# Patient Record
Sex: MALE | Race: BLACK OR AFRICAN AMERICAN | Employment: OTHER | URBAN - METROPOLITAN AREA
[De-identification: names, ages, dates, MRNs, and addresses within clinical notes are randomized per-mention and may not be internally consistent; named-entity substitution may affect disease eponyms.]

---

## 2021-03-18 ENCOUNTER — OFFICE VISIT (OUTPATIENT)
Dept: INTERNAL MEDICINE CLINIC | Age: 31
End: 2021-03-18
Payer: MEDICAID

## 2021-03-18 VITALS
BODY MASS INDEX: 35.04 KG/M2 | SYSTOLIC BLOOD PRESSURE: 131 MMHG | RESPIRATION RATE: 16 BRPM | HEART RATE: 65 BPM | TEMPERATURE: 98.1 F | HEIGHT: 73 IN | OXYGEN SATURATION: 97 % | WEIGHT: 264.4 LBS | DIASTOLIC BLOOD PRESSURE: 86 MMHG

## 2021-03-18 DIAGNOSIS — Z11.59 NEED FOR HEPATITIS C SCREENING TEST: ICD-10-CM

## 2021-03-18 DIAGNOSIS — R10.13 EPIGASTRIC PAIN: ICD-10-CM

## 2021-03-18 DIAGNOSIS — Z00.00 VISIT FOR WELL MAN HEALTH CHECK: Primary | ICD-10-CM

## 2021-03-18 PROCEDURE — 99385 PREV VISIT NEW AGE 18-39: CPT | Performed by: FAMILY MEDICINE

## 2021-03-18 PROCEDURE — 99214 OFFICE O/P EST MOD 30 MIN: CPT | Performed by: FAMILY MEDICINE

## 2021-03-18 PROCEDURE — 93000 ELECTROCARDIOGRAM COMPLETE: CPT | Performed by: FAMILY MEDICINE

## 2021-03-18 RX ORDER — OMEPRAZOLE 40 MG/1
40 CAPSULE, DELAYED RELEASE ORAL DAILY
Qty: 30 CAP | Refills: 3 | Status: SHIPPED | OUTPATIENT
Start: 2021-03-18 | End: 2021-10-28 | Stop reason: ALTCHOICE

## 2021-03-18 NOTE — PROGRESS NOTES
Chief Complaint   Patient presents with    Complete Physical     Patient is here for a wellness visit. he is a 33y.o. year old male who presents for CPE. Complete Physical Exam Questions:    1. Do you follow a low fat diet?  no  2. Are you up to date on your Tdap (<10 years)? Unknown  3. Have you ever had a Pneumovax vaccine (>65)? Not applicable   ABT32 Not applicable   DTET48 Not applicable  4. Have you had Zoster vaccine (>60)? Not applicable  5. Have you had the HPV - Gardasil (13- 26)? Not applicable  6. Do you follow an exercise program?  no  7. Do you smoke?  yes If > 65 and smoker, have you had a abdominal aortic aneurysm ultrasound screen? No  8. Do you consider yourself overweight?  yes  9. Is there a family history of CAD< age 48? Unknown  10. Is there a family history of Cancer? Yes  11. Do you know your Cancer risks? Yes  12. Have you had a colonoscopy? Not applicable  13. Have you been tested for HIV or other STI's? No HIV today(18-66 y/o)? Yes   14. Have you had an EKG in the last five years(>50)? Not applicable  15. Have you had a PSA test done this year (50-69)? Not applicable    Other complaints: none    Reviewed and agree with Nurse Note and duplicated in this note. Reviewed PmHx, RxHx, FmHx, SocHx, AllgHx and updated and dated in the chart. History reviewed. No pertinent family history. History reviewed. No pertinent past medical history.    Social History     Socioeconomic History    Marital status: SINGLE     Spouse name: Not on file    Number of children: Not on file    Years of education: Not on file    Highest education level: Not on file   Tobacco Use    Smoking status: Never Smoker    Smokeless tobacco: Never Used   Substance and Sexual Activity    Alcohol use: Yes     Frequency: 2-4 times a month    Drug use: Yes     Types: Marijuana    Sexual activity: Yes        Review of Systems - negative except as listed above      Objective:     Vitals: 03/18/21 1121   BP: 131/86   Pulse: 65   Resp: 16   Temp: 98.1 °F (36.7 °C)   SpO2: 97%   Weight: 264 lb 6.4 oz (119.9 kg)   Height: 6' 1\" (1.854 m)       Physical Examination: General appearance - alert, well appearing, and in no distress  Eyes - pupils equal and reactive, extraocular eye movements intact  Ears - bilateral TM's and external ear canals normal  Nose - normal and patent, no erythema, discharge or polyps  Mouth - mucous membranes moist, pharynx normal without lesions  Neck - supple, no significant adenopathy  Chest - clear to auscultation, no wheezes, rales or rhonchi, symmetric air entry  Heart - normal rate, regular rhythm, normal S1, S2, no murmurs, rubs, clicks or gallops  Abdomen - soft, nontender, nondistended, no masses or organomegaly  Neurological - alert, oriented, normal speech, no focal findings or movement disorder noted  Musculoskeletal - no joint tenderness, deformity or swelling  Extremities - peripheral pulses normal, no pedal edema, no clubbing or cyanosis  Skin - normal coloration and turgor, no rashes, no suspicious skin lesions noted       Assessment/ Plan:   Diagnoses and all orders for this visit:    1. Visit for well man health check  -     CBC W/O DIFF; Future  -     LIPID PANEL; Future  -     METABOLIC PANEL, COMPREHENSIVE; Future    2. Epigastric pain    3. Need for hepatitis C screening test  -     HEPATITIS C AB; Future           Labs to be drawn: CBC, CMP, Lipid            I have discussed the diagnosis with the patient and the intended plan as seen in the above orders. The patient has received an after-visit summary and questions were answered concerning future plans. Chief Complaint   Patient presents with    Complete Physical     Patient is here for a wellness visit. he is a 33y.o. year old male who presents for evaluation of Maricruz Santana reports 2 months onset of chest discomfort described as  in the mid chest.  The discomfort is intermittent (<1 minute). he is not currently having chest discomfort. he denies radiation of discomfort to the  arm. Associated symptoms include: none. Cardiac risk factors include:  Age > 27 and male. he has not had a past history of cardiovascular disease and has not had recent work ups of chest pain. Reviewed and agree with Nurse Note and duplicated in this note. Reviewed PmHx, RxHx, FmHx, SocHx, AllgHx and updated and dated in the chart. History reviewed. No pertinent family history. History reviewed. No pertinent past medical history.    Social History     Socioeconomic History    Marital status: SINGLE     Spouse name: Not on file    Number of children: Not on file    Years of education: Not on file    Highest education level: Not on file   Tobacco Use    Smoking status: Never Smoker    Smokeless tobacco: Never Used   Substance and Sexual Activity    Alcohol use: Yes     Frequency: 2-4 times a month    Drug use: Yes     Types: Marijuana    Sexual activity: Yes        Review of Systems - negative except as listed above      Objective:     Vitals:    03/18/21 1121   BP: 131/86   Pulse: 65   Resp: 16   Temp: 98.1 °F (36.7 °C)   SpO2: 97%   Weight: 264 lb 6.4 oz (119.9 kg)   Height: 6' 1\" (1.854 m)       Physical Examination: General appearance - alert, well appearing, and in no distress  Eyes - pupils equal and reactive, extraocular eye movements intact  Ears - bilateral TM's and external ear canals normal  Nose - normal and patent, no erythema, discharge or polyps  Mouth - mucous membranes moist, pharynx normal without lesions  Neck - supple, no significant adenopathy  Chest - clear to auscultation, no wheezes, rales or rhonchi, symmetric air entry  Heart - normal rate, regular rhythm, normal S1, S2, no murmurs, rubs, clicks or gallops  Abdomen - soft, nontender, nondistended, no masses or organomegaly  Back exam - full range of motion, no tenderness, palpable spasm or pain on motion  Neurological - alert, oriented, normal speech, no focal findings or movement disorder noted  Musculoskeletal - no joint tenderness, deformity or swelling  Extremities - peripheral pulses normal, no pedal edema, no clubbing or cyanosis  Skin - normal coloration and turgor, no rashes, no suspicious skin lesions noted     Assessment/ Plan:   Diagnoses and all orders for this visit:    1. Visit for well man health check  -     CBC W/O DIFF; Future  -     LIPID PANEL; Future  -     METABOLIC PANEL, COMPREHENSIVE; Future    2. Epigastric pain  -     AMB POC EKG ROUTINE W/ 12 LEADS, INTER & REP    3. Need for hepatitis C screening test  -     HEPATITIS C AB; Future    Other orders  -     omeprazole (PRILOSEC) 40 mg capsule; Take 1 Cap by mouth daily. Patient with epigastric pain most likely related to GERD. Will do a trial of omeprazole. Patient understands if any worsening pain or radiation will proceed to ER. Follow-up in 4 weeks for GERD        I have discussed the diagnosis with the patient and the intended plan as seen in the above orders. The patient has received an after-visit summary and questions were answered concerning future plans. Medication Side Effects and Warnings were discussed with patient,  Patient Labs were reviewed and or requested, and  Patient Past Records were reviewed and or requested  yes       Pt agrees to call or return to clinic and/or go to closest ER with any worsening of symptoms. This may include, but not limited to increased fever (>100.4) with NSAIDS or Tylenol, increased edema, confusion, rash, worsening of presenting symptoms. Please note that this dictation was completed with [employer], the computer voice recognition software. Quite often unanticipated grammatical, syntax, homophones, and other interpretive errors are inadvertently transcribed by the computer software. Please disregard these errors. Please excuse any errors that have escaped final proofreading. Thank you.

## 2021-03-22 LAB
ALBUMIN SERPL-MCNC: 4 G/DL (ref 3.5–5)
ALBUMIN/GLOB SERPL: 1.1 {RATIO} (ref 1.1–2.2)
ALP SERPL-CCNC: 88 U/L (ref 45–117)
ALT SERPL-CCNC: 18 U/L (ref 12–78)
ANION GAP SERPL CALC-SCNC: 5 MMOL/L (ref 5–15)
AST SERPL-CCNC: 18 U/L (ref 15–37)
BILIRUB SERPL-MCNC: 0.6 MG/DL (ref 0.2–1)
BUN SERPL-MCNC: 11 MG/DL (ref 6–20)
BUN/CREAT SERPL: 9 (ref 12–20)
CALCIUM SERPL-MCNC: 9 MG/DL (ref 8.5–10.1)
CHLORIDE SERPL-SCNC: 106 MMOL/L (ref 97–108)
CHOLEST SERPL-MCNC: 158 MG/DL
CO2 SERPL-SCNC: 27 MMOL/L (ref 21–32)
CREAT SERPL-MCNC: 1.2 MG/DL (ref 0.7–1.3)
ERYTHROCYTE [DISTWIDTH] IN BLOOD BY AUTOMATED COUNT: 13.5 % (ref 11.5–14.5)
GLOBULIN SER CALC-MCNC: 3.6 G/DL (ref 2–4)
GLUCOSE SERPL-MCNC: 80 MG/DL (ref 65–100)
HCT VFR BLD AUTO: 42.5 % (ref 36.6–50.3)
HCV AB SERPL QL IA: NONREACTIVE
HCV COMMENT,HCGAC: NORMAL
HDLC SERPL-MCNC: 46 MG/DL
HDLC SERPL: 3.4 {RATIO} (ref 0–5)
HGB BLD-MCNC: 13.6 G/DL (ref 12.1–17)
LDLC SERPL CALC-MCNC: 101.4 MG/DL (ref 0–100)
LIPID PROFILE,FLP: ABNORMAL
MCH RBC QN AUTO: 31 PG (ref 26–34)
MCHC RBC AUTO-ENTMCNC: 32 G/DL (ref 30–36.5)
MCV RBC AUTO: 96.8 FL (ref 80–99)
NRBC # BLD: 0 K/UL (ref 0–0.01)
NRBC BLD-RTO: 0 PER 100 WBC
PLATELET # BLD AUTO: 335 K/UL (ref 150–400)
PMV BLD AUTO: 9.6 FL (ref 8.9–12.9)
POTASSIUM SERPL-SCNC: 4.2 MMOL/L (ref 3.5–5.1)
PROT SERPL-MCNC: 7.6 G/DL (ref 6.4–8.2)
RBC # BLD AUTO: 4.39 M/UL (ref 4.1–5.7)
SODIUM SERPL-SCNC: 138 MMOL/L (ref 136–145)
TRIGL SERPL-MCNC: 53 MG/DL (ref ?–150)
VLDLC SERPL CALC-MCNC: 10.6 MG/DL
WBC # BLD AUTO: 11.7 K/UL (ref 4.1–11.1)

## 2021-09-07 ENCOUNTER — OFFICE VISIT (OUTPATIENT)
Dept: INTERNAL MEDICINE CLINIC | Age: 31
End: 2021-09-07
Payer: MEDICAID

## 2021-09-07 VITALS
RESPIRATION RATE: 16 BRPM | WEIGHT: 267 LBS | BODY MASS INDEX: 35.39 KG/M2 | HEIGHT: 73 IN | TEMPERATURE: 99.2 F | SYSTOLIC BLOOD PRESSURE: 137 MMHG | OXYGEN SATURATION: 98 % | DIASTOLIC BLOOD PRESSURE: 81 MMHG | HEART RATE: 67 BPM

## 2021-09-07 DIAGNOSIS — L24.9 IRRITANT CONTACT DERMATITIS, UNSPECIFIED TRIGGER: ICD-10-CM

## 2021-09-07 DIAGNOSIS — R19.7 DIARRHEA, UNSPECIFIED TYPE: Primary | ICD-10-CM

## 2021-09-07 DIAGNOSIS — R07.89 ATYPICAL CHEST PAIN: ICD-10-CM

## 2021-09-07 PROCEDURE — 99214 OFFICE O/P EST MOD 30 MIN: CPT | Performed by: FAMILY MEDICINE

## 2021-09-07 RX ORDER — TRIAMCINOLONE ACETONIDE 1 MG/G
OINTMENT TOPICAL 2 TIMES DAILY
Qty: 30 G | Refills: 0 | Status: SHIPPED | OUTPATIENT
Start: 2021-09-07 | End: 2021-12-14 | Stop reason: ALTCHOICE

## 2021-09-07 NOTE — PROGRESS NOTES
Chief Complaint   Patient presents with    Heartburn    Rash    Generalized Body Aches     he is a 33y.o. year old male who presents for evaluation of body rash, chest pain and possible heartburn. Patient states that intermittently he'll get left chest wall tightness. He cannot relate this with food or exercise. Patient states that it has moved from his epigastric region or mid chest to the left side of his chest wall. States that he took an antacid back in March and this did not help. Patient does seem anxious and worried about his heart in general.  Patient also states that he has a rash along his beltline and also along his neckline. Patient states that since high school anytime he wears a metal necklace he will break out. Reviewed and agree with Nurse Note and duplicated in this note. Reviewed PmHx, RxHx, FmHx, SocHx, AllgHx and updated and dated in the chart. No family history on file. No past medical history on file. Social History     Socioeconomic History    Marital status: SINGLE     Spouse name: Not on file    Number of children: Not on file    Years of education: Not on file    Highest education level: Not on file   Tobacco Use    Smoking status: Never Smoker    Smokeless tobacco: Never Used   Substance and Sexual Activity    Alcohol use: Yes    Drug use: Yes     Types: Marijuana    Sexual activity: Yes     Social Determinants of Health     Financial Resource Strain:     Difficulty of Paying Living Expenses:    Food Insecurity:     Worried About Running Out of Food in the Last Year:     [address] in the Last Year:    Transportation Needs:     Lack of Transportation (Medical):      Lack of Transportation (Non-Medical):    Physical Activity:     Days of Exercise per Week:     Minutes of Exercise per Session:    Stress:     Feeling of Stress :    Social Connections:     Frequency of Communication with Friends and Family:     Frequency of Social Gatherings with Friends and Family:     Attends Protestant Services:     Active Member of Clubs or Organizations:     Attends Club or Organization Meetings:     Marital Status:         Review of Systems - negative except as listed above      Objective:     Vitals:    09/07/21 1514   BP: 137/81   Pulse: 67   Resp: 16   Temp: 99.2 °F (37.3 °C)   SpO2: 98%   Weight: 267 lb (121.1 kg)   Height: 6' 1\" (1.854 m)       Physical Examination: General appearance - alert, well appearing, and in no distress  Eyes - pupils equal and reactive, extraocular eye movements intact  Ears - bilateral TM's and external ear canals normal  Nose - normal and patent, no erythema, discharge or polyps  Mouth - mucous membranes moist, pharynx normal without lesions  Neck - supple, no significant adenopathy  Chest - clear to auscultation, no wheezes, rales or rhonchi, symmetric air entry  Heart - normal rate, regular rhythm, normal S1, S2, no murmurs, rubs, clicks or gallops  Abdomen - soft, nontender, nondistended, no masses or organomegaly  Musculoskeletal - no joint tenderness, deformity or swelling  Extremities - peripheral pulses normal, no pedal edema, no clubbing or cyanosis  Skin - normal coloration and turgor, no rashes, no suspicious skin lesions noted     Assessment/ Plan:   Diagnoses and all orders for this visit:    1. Diarrhea, unspecified type  -     REFERRAL TO GASTROENTEROLOGY  -     REFERRAL TO DERMATOLOGY  -     FOOD ALLERGY PROFILE; Future    2. Atypical chest pain  -     REFERRAL TO CARDIOLOGY    3. Irritant contact dermatitis, unspecified trigger    Other orders  -     triamcinolone acetonide (KENALOG) 0.1 % ointment; Apply  to affected area two (2) times a day. use thin layer           I have discussed the diagnosis with the patient and the intended plan as seen in the above orders. The patient has received an after-visit summary and questions were answered concerning future plans.      Medication Side Effects and Warnings were discussed with patient,  Patient Labs were reviewed and or requested, and  Patient Past Records were reviewed and or requested  yes       Pt agrees to call or return to clinic and/or go to closest ER with any worsening of symptoms. This may include, but not limited to increased fever (>100.4) with NSAIDS or Tylenol, increased edema, confusion, rash, worsening of presenting symptoms. Please note that this dictation was completed with [employer], the computer voice recognition software. Quite often unanticipated grammatical, syntax, homophones, and other interpretive errors are inadvertently transcribed by the computer software. Please disregard these errors. Please excuse any errors that have escaped final proofreading. Thank you.

## 2021-09-08 LAB
COMMENT, HOLDF: NORMAL
SAMPLES BEING HELD,HOLD: NORMAL

## 2021-09-10 LAB
CLAM IGE QN: <0.1 KU/L
CLASS DESCRIPTION, 600268: ABNORMAL
CODFISH IGE QN: <0.1 KU/L
CORN IGE QN: <0.1 KU/L
COW MILK IGE QN: <0.1 KU/L
EGG WHITE IGE QN: <0.1 KU/L
PEANUT IGE QN: 0.6 KU/L
SCALLOP IGE QN: 0.14 KU/L
SESAME SEED IGE QN: <0.1 KU/L
SHRIMP IGE QN: 0.11 KU/L
SOYBEAN IGE QN: <0.1 KU/L
WALNUT IGE QN: <0.1 KU/L
WHEAT IGE QN: <0.1 KU/L

## 2021-09-16 ENCOUNTER — OFFICE VISIT (OUTPATIENT)
Dept: INTERNAL MEDICINE CLINIC | Age: 31
End: 2021-09-16
Payer: MEDICAID

## 2021-09-16 VITALS
RESPIRATION RATE: 16 BRPM | OXYGEN SATURATION: 98 % | SYSTOLIC BLOOD PRESSURE: 124 MMHG | BODY MASS INDEX: 36.31 KG/M2 | WEIGHT: 274 LBS | HEART RATE: 87 BPM | TEMPERATURE: 98.9 F | DIASTOLIC BLOOD PRESSURE: 80 MMHG | HEIGHT: 73 IN

## 2021-09-16 DIAGNOSIS — G57.02 PIRIFORMIS SYNDROME OF LEFT SIDE: Primary | ICD-10-CM

## 2021-09-16 PROCEDURE — 99214 OFFICE O/P EST MOD 30 MIN: CPT | Performed by: FAMILY MEDICINE

## 2021-09-16 PROCEDURE — 72100 X-RAY EXAM L-S SPINE 2/3 VWS: CPT | Performed by: FAMILY MEDICINE

## 2021-09-16 RX ORDER — NAPROXEN 500 MG/1
500 TABLET ORAL 2 TIMES DAILY WITH MEALS
Qty: 30 TABLET | Refills: 0 | Status: SHIPPED | OUTPATIENT
Start: 2021-09-16 | End: 2021-12-14 | Stop reason: ALTCHOICE

## 2021-09-16 NOTE — PROGRESS NOTES
Chief Complaint   Patient presents with    Generalized Body Aches     Patient is here for a follow up     he is a 33y.o. year old male who presents for follow-up of body pain   Pain Assessment Encounter      Gómez Tika.  9/16/2021  Onset of Symptoms: Patient states he has had pain for months   ________________________________________________________________________  Description:Patient states pain is more so in the back of left leg     Frequency: 5 times a day  Pain Scale:(1-10): 6  Trauma Hx: none   Hx of similar symptoms: No:   Radiation: NO, leg  Duration:  continuous      Progression: has worsened  What makes it better?: heat, ice and OTC meds  What makes it worse?:exercise, strecthing and walking  Medications tried: acetaminophen, ibuprofen, aspirin    Reviewed and agree with Nurse Note and duplicated in this note. Reviewed PmHx, RxHx, FmHx, SocHx, AllgHx and updated and dated in the chart. No family history on file. No past medical history on file. Social History     Socioeconomic History    Marital status: SINGLE     Spouse name: Not on file    Number of children: Not on file    Years of education: Not on file    Highest education level: Not on file   Tobacco Use    Smoking status: Never Smoker    Smokeless tobacco: Never Used   Substance and Sexual Activity    Alcohol use: Yes    Drug use: Yes     Types: Marijuana    Sexual activity: Yes     Social Determinants of Health     Financial Resource Strain:     Difficulty of Paying Living Expenses:    Food Insecurity:     Worried About Running Out of Food in the Last Year:     [address] in the Last Year:    Transportation Needs:     Lack of Transportation (Medical):      Lack of Transportation (Non-Medical):    Physical Activity:     Days of Exercise per Week:     Minutes of Exercise per Session:    Stress:     Feeling of Stress :    Social Connections:     Frequency of Communication with Friends and Family:     Frequency of Social Gatherings with Friends and Family:     Attends [employer]ian[employer] Services:     Active Member of Clubs or Organizations:     Attends Club or Organization Meetings:     Marital Status:         Review of Systems - negative except as listed above      Objective:     Vitals:    09/16/21 1401   BP: 124/80   Pulse: 87   Resp: 16   Temp: 98.9 °F (37.2 °C)   SpO2: 98%   Weight: 274 lb (124.3 kg)   Height: 6' 1\" (1.854 m)       Physical Examination: General appearance - alert, well appearing, and in no distress  Back exam - full range of motion, no tenderness, palpable spasm or pain on motion, negative straight-leg raise bilaterally , normal reflexes and strength bilateral lower extremities, sensory exam intact bilateral lower extremities  Neurological - alert, oriented, normal speech, no focal findings or movement disorder noted  Musculoskeletal -   MSK - Hip left:    Deformity: None    ROM:     Flexion: Normal    Extension: Normal     Internal/external rotation: Normal      Gait: Normal       Palpation:    L1-L5: Negative tenderness    Sacrum: Negative tenderness    Coccyx: Negativetenderness    Left Paraspinal: Negativetenderness    Right Paraspinal: Negativetenderness    Greater trochanter: Negativetenderness    Ischial Tuberosity: Negativetenderness    Piriformis: Positivetenderness       Strength (0-5/5)    Hip Flexion:  Left: 5/5  Right: 5/5    Hip Extension: Left: 5/5  Right: 5/5    Hip Abduction: Left: 5/5  Right: 5/5    Hip Adduction: Left: 5/5  Right: 5/5    Knee Extension: Left: 5/5  Right: 5/5    Knee Flexion:  Left: 5/5  Right: 5/5    One leg squat:       Sensation: Intact, no deficits      DTR:    Patella:2       Achilles: 2   Special test:    Straight leg:Negative     Cecils:Negative     Piriformis:Negative     FADIR is Negative      Extremities - peripheral pulses normal, no pedal edema, no clubbing or cyanosis  Skin - normal coloration and turgor, no rashes, no suspicious skin lesions noted Assessment/ Plan:   Diagnoses and all orders for this visit:    1. Piriformis syndrome of left side  -     XR SPINE LUMB 2 OR 3 V; Future    Other orders  -     naproxen (NAPROSYN) 500 mg tablet; Take 1 Tablet by mouth two (2) times daily (with meals). Pathophysiology, recovery and rehabilitation process discussed and questions answered   Counseling for 30 Minutes of the total visit duration   Pictures and figures used as necessary   Provided reassurance   Monitor response to injection   Discussed steroid side effects of fat atrophy, hypopigmentation, steroid flare or infection   Monitor response to Physical Therapy   Recommend activity modification   Recommend  lower impact activities-walking, Eliptical, Nordic Track, cycling or swimming              1) Remember to stay active and/or exercise regularly (I suggest 30-45 minutes daily)   2) For reliable dietary information, go to www. EATRIGHT.org. You may wish to consider seeing the nutritionist at [facility] [phone], also consider the [address] I have discussed the diagnosis with the patient and the intended plan as seen in the above orders. The patient has received an after-visit summary and questions were answered concerning future plans. Medication Side Effects and Warnings were discussed with patient,  Patient Labs were reviewed and or requested, and  Patient Past Records were reviewed and or requested  yes      Pt agrees to call or return to clinic and/or go to closest ER with any worsening of symptoms. This may include, but not limited to increased fever (>100.4) with NSAIDS or Tylenol, increased edema, confusion, rash, worsening of presenting symptoms. Please note that this dictation was completed with [employer], the computer voice recognition software. Quite often unanticipated grammatical, syntax, homophones, and other interpretive errors are inadvertently transcribed by the computer software.   Please disregard these errors. Please excuse any errors that have escaped final proofreading. Thank you.

## 2021-09-22 ENCOUNTER — HOSPITAL ENCOUNTER (OUTPATIENT)
Dept: PHYSICAL THERAPY | Age: 31
Discharge: HOME | End: 2021-09-22
Payer: MEDICAID

## 2021-09-22 DIAGNOSIS — G57.02 PIRIFORMIS SYNDROME OF LEFT SIDE: ICD-10-CM

## 2021-09-22 PROCEDURE — 97161 PT EVAL LOW COMPLEX 20 MIN: CPT | Performed by: PHYSICAL THERAPIST

## 2021-09-22 NOTE — PROGRESS NOTES
Physical Therapy at [facility],   a part of [address], [address] [place] [address]  Phone: [phone]  Fax: [phone]    Plan of Care/Statement of Necessity for Physical Therapy Services  2-15    Patient name: Mamie Faith. : 1990  Provider#: [id number]  Referral source: Gala Ingram MD      Medical/Treatment Diagnosis: Piriformis syndrome of left side [G57.02]     Prior Hospitalization: see medical history     Comorbidities: previous back injury  Prior Level of Function: complete 20 minutes of exercise at least 1-2 times a week  Medications: Verified on Patient Summary List    Start of Care: 2021      Onset Date: 2020       The Plan of Care and following information is based on the information from the initial evaluation. Assessment/ key information: 33 y.o male with left piriformis syndrome secondary to core/pelvis muscle performance deficit and lumbar muscle performance deficit with associated sciatic nerve tension intolerance. Evaluation Complexity History MEDIUM  Complexity : 1-2 comorbidities / personal factors will impact the outcome/ POC ; Examination HIGH Complexity : 4+ Standardized tests and measures addressing body structure, function, activity limitation and / or participation in recreation  ;Presentation LOW Complexity : Stable, uncomplicated  ;   Overall Complexity Rating: LOW     Problem List: pain affecting function, decrease ROM, decrease strength, decrease ADL/ functional abilitiies, decrease activity tolerance and decrease flexibility/ joint mobility   Treatment Plan may include any combination of the following: Therapeutic exercise, Therapeutic activities, Neuromuscular re-education, Physical agent/modality, Gait/balance training, Manual therapy, Patient education and Self Care training  Patient / Family readiness to learn indicated by: asking questions and trying to perform skills  Persons(s) to be included in education: patient (P)  Barriers to Learning/Limitations: None  Patient Goal (s): get rid of pain while sitting  Patient Self Reported Health Status: good  Rehabilitation Potential: good    Short Term Goals: To be accomplished in 4-6 treatments:  1) Pt will be Independent with HEP  2) Pt will be able to Sit greater than 15 minutes without pain  3) Pt will be able to Stand greater than 15 minutes without increase of pain  4) Pt will be able to Ambulate greater than 1 mile without increase of pain    Long Term Goals: To be accomplished in 8-12 treatments:  1)  Pt will be able to Sit greater than 45 minutes without pain  2)Pt will be able to retrieve item form ground without pain  3) Pt will be able to carry >/= 20 lbs without pain      Frequency / Duration: Patient to be seen 1-2 times per week for 8-12 treatments. Patient/ Caregiver education and instruction: activity modification    [x]  Plan of care has been reviewed with MEIR Pantoja, PT, DPT 9/22/2021     ________________________________________________________________________    I certify that the above Therapy Services are being furnished while the patient is under my care. I agree with the treatment plan and certify that this therapy is necessary.     [de-identified] Signature:____________________  Date:____________Time: _________      Pavan Soto MD

## 2021-09-22 NOTE — PROGRESS NOTES
PT INITIAL EVALUATION NOTE 2-15    Patient Name: Rika Capps. Date:2021  : 1990  [x]  Patient  Verified  Payor: Shira Becerra / Plan: PFI Acquisition / Product Type: Managed Care Medicaid /    In QWAR:[id number]  Out time:1145p  Total Treatment Time (min): 30  Visit #: 1     Treatment Area: Piriformis syndrome of left side [G57.02]    SUBJECTIVE  Pain Level (0-10 scale): 6/10  Any medication changes, allergies to medications, adverse drug reactions, diagnosis change, or new procedure performed?: [] No    [x] Yes (see summary sheet for update)  Subjective:     Onset of left buttock pain approx 1 year ago. Currently he feels increased left hip/buttock. Increase of left buttock, posterior thigh and occasionally the left achilles sitting > 5 minutes. 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  injured his back that got better with therapy. OBJECTIVE    Posture:  midline alignment in standing, slouched sitting posure  Other Observations:  --  Gait and Functional Mobility:  Increased pain with sit-stand and bed rolling  Palpation: tenderness left buttock region and bilateral lumbar paraspinal muscles        Lumbar AROM:          R  L    Flexion    60  --    Extension   20  --    Side Bending   20 P! L  15 P! L    Rotation   30  30        LOWER QUARTER   MUSCLE STRENGTH  KEY       R  L  0 - No Contraction  L1, L2 Psoas  5  5  1 - Trace   L3 Quads  5  5  2 - Poor   L4 Tib Ant  5  5  3 - Fair    L5 EHL  5  5  4 - Good   S1 Peroneals  5  5  5 - Normal   S2 Hams  5  5    Flexibility: left hip stiffness  Mobility Assessment: Normal Lumbar A-P and P-A mobility      MMT:               HIP Ext: L 4/5  r 5/5              HIP Abd: : L 4/5   R 5/5  Neurological: Reflexes / Sensations: normal  Special Tests:     Forward Bend: positive      H.S. SLR: negative            Other Objective/Functional Measures: --    Pain Level (0-10 scale) post treatment: 6/10      ASSESSMENT:      [x]  See Plan of Care      Austin Galan PT, DPT 9/22/2021

## 2021-09-29 ENCOUNTER — HOSPITAL ENCOUNTER (OUTPATIENT)
Dept: PHYSICAL THERAPY | Age: 31
Discharge: HOME | End: 2021-09-29
Payer: MEDICAID

## 2021-09-29 PROCEDURE — 97110 THERAPEUTIC EXERCISES: CPT

## 2021-09-29 PROCEDURE — 97140 MANUAL THERAPY 1/> REGIONS: CPT

## 2021-09-29 NOTE — PROGRESS NOTES
PT DAILY TREATMENT NOTE - [facility] 2-15    Patient Name: Timbo Basurto. Date:2021  : 1990  [x]  Patient  Verified  Payor: Ric Fee / Plan: [employer] / Product Type: Managed Care Medicaid /    In time: 1:46P  Out time: 2:55P  Total Treatment Time (min): 69  Total Timed Codes (min): 59  1:1 Treatment Time ( [address] only): --   Visit #:  2    Treatment Area: Left hip pain [M25.552]    SUBJECTIVE  Pain Level (0-10 scale): 5/10  Any medication changes, allergies to medications, adverse drug reactions, diagnosis change, or new procedure performed?: [x] No    [] Yes (see summary sheet for update)  Subjective functional status/changes:   [] No changes reported  Pt reported feeling okay today. OBJECTIVE    Modality rationale: decrease edema, decrease inflammation and decrease pain to improve the patients ability to decrease L hip pain.    Min Type Additional Details       [] Estim: []Att   []Unatt    []TENS instruct                  []IFC  []Premod   []NMES                     []Other:  []w/US   []w/ice   []w/heat  Position:  Location:       []  Traction: [] Cervical       []Lumbar                       [] Prone          []Supine                       []Intermittent   []Continuous Lbs:  [] before manual  [] after manual  []w/heat    []  Ultrasound: []Continuous   [] Pulsed                       at: []1MHz   []3MHz Location:  W/cm2:    [] Paraffin         Location:   []w/heat   10 [x]  Ice     []  Heat  []  Ice massage Position: R s/l  Location: L hip    []  Laser  []  Other: Position:  Location:      []  Vasopneumatic Device Pressure:       [] lo [] med [] hi   Temperature:      [x] Skin assessment post-treatment:  [x]intact []redness- no adverse reaction    []redness  adverse reaction:     44 min Therapeutic Exercise:  [x] See flow sheet :   Rationale: increase ROM, increase strength, improve coordination, improve balance and increase proprioception to improve the patients ability to increase hip stability    15 min Manual Therapy: STM/MFR L piriformis and gluts. MWM L piriformis with hip IR and ER    Rationale: decrease pain, increase ROM, increase tissue extensibility and decrease trigger points to improve the patients ability to increase mobility          With   [] TE   [] TA   [] Neuro   [] SC   [] other: Patient Education: [x] Review HEP    [] Progressed/Changed HEP based on:   [] positioning   [] body mechanics   [] transfers   [] heat/ice application    [] other:      Other Objective/Functional Measures: FOTO 95     Pain Level (0-10 scale) post treatment: 4/10    ASSESSMENT/Changes in Function:   Pt reported after riding the bike and sitting on the table he felt an increase in radicular symptoms in his L leg. symptoms resolved after stretching and walking around clinic. May try treadmill next session. Patient will continue to benefit from skilled PT services to modify and progress therapeutic interventions, address functional mobility deficits, address ROM deficits, address strength deficits, analyze and address soft tissue restrictions, analyze and cue movement patterns, analyze and modify body mechanics/ergonomics and assess and modify postural abnormalities to attain remaining goals. []  See Plan of Care  []  See progress note/recertification  []  See Discharge Summary         Progress towards goals / Updated goals:  Short Term Goals: To be accomplished in 4-6 treatments:  1) Pt will be Independent with HEP  2) Pt will be able to Sit greater than 15 minutes without pain  3) Pt will be able to Stand greater than 15 minutes without increase of pain  4) Pt will be able to Ambulate greater than 1 mile without increase of pain     Long Term Goals:  To be accomplished in 8-12 treatments:  1)  Pt will be able to Sit greater than 45 minutes without pain  2)Pt will be able to retrieve item form ground without pain  3) Pt will be able to carry >/= 20 lbs without pain                        Frequency / Duration: Patient to be seen 1-2 times per week for 8-12 treatments.     PLAN  [x]  Upgrade activities as tolerated     [x]  Continue plan of care  []  Update interventions per flow sheet       []  Discharge due to:_  []  Other:_      Leita Leyden, PTA, [facility] 9/29/2021

## 2021-09-30 ENCOUNTER — HOSPITAL ENCOUNTER (OUTPATIENT)
Dept: PHYSICAL THERAPY | Age: 31
Discharge: HOME | End: 2021-09-30
Payer: MEDICAID

## 2021-09-30 PROCEDURE — 97140 MANUAL THERAPY 1/> REGIONS: CPT

## 2021-09-30 PROCEDURE — 97110 THERAPEUTIC EXERCISES: CPT

## 2021-09-30 NOTE — PROGRESS NOTES
PT DAILY TREATMENT NOTE - [facility] 2-15    Patient Name: Hilda Burnett. Date:2021  : 1990  [x]  Patient  Verified  Payor: Riazkacie  / Plan: Lenny Lopez / Product Type: Managed Care Medicaid /    In time: 1:17P  Out time: 2:10P  Total Treatment Time (min): 53  Total Timed Codes (min): 43  1:1 Treatment Time ( [address] only): --   Visit #:  3    Treatment Area: Left hip pain [M25.552]    SUBJECTIVE  Pain Level (0-10 scale): 0/10  Any medication changes, allergies to medications, adverse drug reactions, diagnosis change, or new procedure performed?: [x] No    [] Yes (see summary sheet for update)  Subjective functional status/changes:   [] No changes reported  Pt reported feeling alright after session yesterday. OBJECTIVE    Modality rationale: decrease edema, decrease inflammation and decrease pain to improve the patients ability to decrease L hip pain.    Min Type Additional Details       [] Estim: []Att   []Unatt    []TENS instruct                  []IFC  []Premod   []NMES                     []Other:  []w/US   []w/ice   []w/heat  Position:  Location:       []  Traction: [] Cervical       []Lumbar                       [] Prone          []Supine                       []Intermittent   []Continuous Lbs:  [] before manual  [] after manual  []w/heat    []  Ultrasound: []Continuous   [] Pulsed                       at: []1MHz   []3MHz Location:  W/cm2:    [] Paraffin         Location:   []w/heat   10 [x]  Ice     []  Heat  []  Ice massage Position: R s/l  Location: L hip    []  Laser  []  Other: Position:  Location:      []  Vasopneumatic Device Pressure:       [] lo [] med [] hi   Temperature:      [x] Skin assessment post-treatment:  [x]intact []redness- no adverse reaction    []redness  adverse reaction:     33 min Therapeutic Exercise:  [x] See flow sheet :   Rationale: increase ROM, increase strength, improve coordination, improve balance and increase proprioception to improve the patients ability to increase hip stability    10 min Manual Therapy: STM/MFR L piriformis and gluts. MWM L piriformis with hip IR and ER    Rationale: decrease pain, increase ROM, increase tissue extensibility and decrease trigger points to improve the patients ability to increase mobility          With   [] TE   [] TA   [] Neuro   [] SC   [] other: Patient Education: [x] Review HEP    [] Progressed/Changed HEP based on:   [] positioning   [] body mechanics   [] transfers   [] heat/ice application    [] other:      Other Objective/Functional Measures: --     Pain Level (0-10 scale) post treatment: 0/10    ASSESSMENT/Changes in Function:   Pt tolerated session well. Increases sets of stability and strengthening exercise. No pain noted. Patient will continue to benefit from skilled PT services to modify and progress therapeutic interventions, address functional mobility deficits, address ROM deficits, address strength deficits, analyze and address soft tissue restrictions, analyze and cue movement patterns, analyze and modify body mechanics/ergonomics and assess and modify postural abnormalities to attain remaining goals. []  See Plan of Care  []  See progress note/recertification  []  See Discharge Summary         Progress towards goals / Updated goals:  Short Term Goals: To be accomplished in 4-6 treatments:  1) Pt will be Independent with HEP  2) Pt will be able to Sit greater than 15 minutes without pain  3) Pt will be able to Stand greater than 15 minutes without increase of pain  4) Pt will be able to Ambulate greater than 1 mile without increase of pain     Long Term Goals: To be accomplished in 8-12 treatments:  1)  Pt will be able to Sit greater than 45 minutes without pain  2)Pt will be able to retrieve item form ground without pain  3) Pt will be able to carry >/= 20 lbs without pain                        Frequency / Duration: Patient to be seen 1-2 times per week for 8-12 treatments.     PLAN  [x] Upgrade activities as tolerated     [x]  Continue plan of care  []  Update interventions per flow sheet       []  Discharge due to:_  []  Other:_      Jak Zhu PTA, OPT[facility] 9/30/2021

## 2021-10-07 ENCOUNTER — APPOINTMENT (OUTPATIENT)
Dept: PHYSICAL THERAPY | Age: 31
End: 2021-10-07
Payer: MEDICAID

## 2021-10-08 ENCOUNTER — HOSPITAL ENCOUNTER (OUTPATIENT)
Dept: PREADMISSION TESTING | Age: 31
Discharge: HOME | End: 2021-10-08
Payer: MEDICAID

## 2021-10-08 ENCOUNTER — TRANSCRIBE ORDER (OUTPATIENT)
Dept: REGISTRATION | Age: 31
End: 2021-10-08

## 2021-10-08 DIAGNOSIS — Z01.812 PRE-PROCEDURE LAB EXAM: ICD-10-CM

## 2021-10-08 DIAGNOSIS — Z01.812 PRE-PROCEDURE LAB EXAM: Primary | ICD-10-CM

## 2021-10-08 PROCEDURE — U0005 INFEC AGEN DETEC AMPLI PROBE: HCPCS

## 2021-10-10 LAB
SARS-COV-2, XPLCVT: NOT DETECTED
SOURCE, COVRS: NORMAL

## 2021-10-12 ENCOUNTER — APPOINTMENT (OUTPATIENT)
Dept: PHYSICAL THERAPY | Age: 31
End: 2021-10-12
Payer: MEDICAID

## 2021-10-12 ENCOUNTER — ANESTHESIA (OUTPATIENT)
Dept: ENDOSCOPY | Age: 31
End: 2021-10-12
Payer: MEDICAID

## 2021-10-12 ENCOUNTER — HOSPITAL ENCOUNTER (OUTPATIENT)
Age: 31
Setting detail: OUTPATIENT SURGERY
Discharge: HOME | End: 2021-10-12
Attending: SPECIALIST | Admitting: SPECIALIST
Payer: MEDICAID

## 2021-10-12 ENCOUNTER — ANESTHESIA EVENT (OUTPATIENT)
Dept: ENDOSCOPY | Age: 31
End: 2021-10-12
Payer: MEDICAID

## 2021-10-12 VITALS
HEART RATE: 57 BPM | TEMPERATURE: 98.6 F | DIASTOLIC BLOOD PRESSURE: 87 MMHG | OXYGEN SATURATION: 99 % | RESPIRATION RATE: 21 BRPM | SYSTOLIC BLOOD PRESSURE: 149 MMHG

## 2021-10-12 LAB
H PYLORI FROM TISSUE: NEGATIVE
KIT LOT NO., HCLOLOT: NORMAL
NEGATIVE CONTROL: NEGATIVE
POSITIVE CONTROL: POSITIVE

## 2021-10-12 PROCEDURE — 74011000250 HC RX REV CODE- 250: Performed by: NURSE ANESTHETIST, CERTIFIED REGISTERED

## 2021-10-12 PROCEDURE — 74011250636 HC RX REV CODE- 250/636: Performed by: NURSE ANESTHETIST, CERTIFIED REGISTERED

## 2021-10-12 PROCEDURE — 76040000019: Performed by: SPECIALIST

## 2021-10-12 PROCEDURE — 87077 CULTURE AEROBIC IDENTIFY: CPT | Performed by: SPECIALIST

## 2021-10-12 PROCEDURE — 77030021593 HC FCPS BIOP ENDOSC BSC -A: Performed by: SPECIALIST

## 2021-10-12 PROCEDURE — 76060000031 HC ANESTHESIA FIRST 0.5 HR: Performed by: SPECIALIST

## 2021-10-12 PROCEDURE — 2709999900 HC NON-CHARGEABLE SUPPLY: Performed by: SPECIALIST

## 2021-10-12 PROCEDURE — 88305 TISSUE EXAM BY PATHOLOGIST: CPT

## 2021-10-12 RX ORDER — LIDOCAINE HYDROCHLORIDE 20 MG/ML
INJECTION, SOLUTION EPIDURAL; INFILTRATION; INTRACAUDAL; PERINEURAL AS NEEDED
Status: DISCONTINUED | OUTPATIENT
Start: 2021-10-12 | End: 2021-10-12 | Stop reason: HOSPADM

## 2021-10-12 RX ORDER — SODIUM CHLORIDE 9 MG/ML
INJECTION, SOLUTION INTRAVENOUS
Status: DISCONTINUED | OUTPATIENT
Start: 2021-10-12 | End: 2021-10-12 | Stop reason: HOSPADM

## 2021-10-12 RX ORDER — PROPOFOL 10 MG/ML
INJECTION, EMULSION INTRAVENOUS AS NEEDED
Status: DISCONTINUED | OUTPATIENT
Start: 2021-10-12 | End: 2021-10-12 | Stop reason: HOSPADM

## 2021-10-12 RX ADMIN — PROPOFOL 50 MG: 10 INJECTION, EMULSION INTRAVENOUS at 11:39

## 2021-10-12 RX ADMIN — LIDOCAINE HYDROCHLORIDE 100 MG: 20 INJECTION, SOLUTION EPIDURAL; INFILTRATION; INTRACAUDAL; PERINEURAL at 11:33

## 2021-10-12 RX ADMIN — PROPOFOL 150 MG: 10 INJECTION, EMULSION INTRAVENOUS at 11:33

## 2021-10-12 RX ADMIN — SODIUM CHLORIDE: 900 INJECTION, SOLUTION INTRAVENOUS at 11:16

## 2021-10-12 RX ADMIN — PROPOFOL 50 MG: 10 INJECTION, EMULSION INTRAVENOUS at 11:35

## 2021-10-12 NOTE — H&P
Pre-endoscopy H and P     The patient was seen and examined in the endoscopy suite. The airway was assessed and docuemented. The problem list and medications were reviewed. There is no problem list on file for this patient. Social History     Socioeconomic History    Marital status: SINGLE     Spouse name: Not on file    Number of children: Not on file    Years of education: Not on file    Highest education level: Not on file   Occupational History    Not on file   Tobacco Use    Smoking status: Never Smoker    Smokeless tobacco: Never Used   Substance and Sexual Activity    Alcohol use: Yes    Drug use: Yes     Types: Marijuana    Sexual activity: Yes   Other Topics Concern    Not on file   Social History Narrative    Not on file     Social Determinants of Health     Financial Resource Strain:     Difficulty of Paying Living Expenses:    Food Insecurity:     Worried About Running Out of Food in the Last Year:     [address] in the Last Year:    Transportation Needs:     Lack of Transportation (Medical):  Lack of Transportation (Non-Medical):    Physical Activity:     Days of Exercise per Week:     Minutes of Exercise per Session:    Stress:     Feeling of Stress :    Social Connections:     Frequency of Communication with Friends and Family:     Frequency of Social Gatherings with Friends and Family:     Attends [employer] Services:     Active Member of Clubs or Organizations:     Attends Club or Organization Meetings:     Marital Status:    Intimate Partner Violence:     Fear of Current or Ex-Partner:     Emotionally Abused:     Physically Abused:     Sexually Abused:      Past Medical History:   Diagnosis Date    GERD (gastroesophageal reflux disease)          Prior to Admission Medications   Prescriptions Last Dose Informant Patient Reported?  Taking?   naproxen (NAPROSYN) 500 mg tablet Not Taking at Unknown time  No No   Sig: Take 1 Tablet by mouth two (2) times daily (with meals). Patient not taking: Reported on 10/12/2021   omeprazole (PRILOSEC) 40 mg capsule Not Taking at Unknown time  No No   Sig: Take 1 Cap by mouth daily. Patient not taking: Reported on 10/12/2021   triamcinolone acetonide (KENALOG) 0.1 % ointment   No No   Sig: Apply  to affected area two (2) times a day. use thin layer      Facility-Administered Medications: None       Chief complaint, history of present illness, and review of systems and Past medical History are positive for: epigastric pain, diarrhea, chest pain, RUQ pain    The heart, lungs and mental status were satisfactory for the administration of sedation and for the procedure. I discussed with the patient the objectives, risks, consequences and alternatives to the procedure. The patient was counseled at length about the risks of binu Covid-19 in the regi-operative and post-operative states including the recovery window of their procedure. The patient was made aware that binu Covid-19 after a surgical procedure may worsen their prognosis for recovering from the virus and lend to a higher morbidity and or mortality risk. The patient was given the options of postponing their procedure. All of the risks, benefits, and alternatives were discussed. The patient does  wish to proceed with the procedure.     Plan: Endoscopic procedure with sedation     Lex Vernon MD   10/12/2021  11:28 AM

## 2021-10-12 NOTE — DISCHARGE INSTRUCTIONS
Corita Rubinstein  [id number]  1990    EGD DISCHARGE INSTRUCTIONS  Discomfort:  Sore throat- throat lozenges or warm salt water gargle  redness at IV site- apply warm compress to area; if redness or soreness persist- contact your physician  Gaseous discomfort- walking, belching will help relieve any discomfort    DIET  You may resume your regular diet - however -  remember your colon is empty and a heavy meal will produce gas. Avoid these foods:  vegetables, fried / greasy foods, carbonated drinks  You may not drink alcoholic beverages for at least 12 hours    MEDICATIONS   Regarding Aspirin or Nonsteroidal medications specifically, please see below. ACTIVITY  You may resume your normal daily activities. Spend the remainder of the day resting -  avoid any strenuous activity. You may not operate a vehicle for 12 hours  You may not engage in an occupation involving machinery or appliances for rest of today. Avoid making any critical decisions for at least 24 hour    CALL M.D. ANY SIGN OF   Increasing pain, nausea, vomiting  Abdominal distension (swelling)  New increased bleeding (oral or rectal)  Fever (chills)  Pain in chest area  Bloody discharge from nose or mouth  Shortness of breath    You may not  take any Advil, Aspirin, Ibuprofen, Motrin, Aleve, or Goodys for 10 days, ONLY  Tylenol as needed for pain.     Post procedure diagnosis: duodenitis  submucosal lesion of esophagus      Follow-up Instructions:   Call Dr. Lukasz Morales  Results of procedure / biopsy in 10 days  Telephone #  [phone]        DISCHARGE SUMMARY from Nurse    The following personal items collected during your admission are returned to you:   Dental Appliance:    Vision: Visual Aid: None  Hearing Aid:    Jewelry:    Clothing:    Other Valuables:    Valuables sent to safe:

## 2021-10-12 NOTE — PERIOP NOTES

## 2021-10-12 NOTE — ANESTHESIA POSTPROCEDURE EVALUATION
Procedure(s):  ESOPHAGOGASTRODUODENOSCOPY (EGD)   :-  ESOPHAGOGASTRODUODENAL (EGD) BIOPSY. MAC    Anesthesia Post Evaluation        Patient participation: complete - patient participated  Level of consciousness: awake  Pain management: adequate  Airway patency: patent  Anesthetic complications: no  Cardiovascular status: hemodynamically stable  Respiratory status: acceptable  Hydration status: acceptable  Comments: The patient is ready for PACU discharge. Steffanie Cobb DO                   Post anesthesia nausea and vomiting:  controlled      INITIAL Post-op Vital signs:   Vitals Value Taken Time   /87 10/12/21 1211   Temp 37 °C (98.6 °F) 10/12/21 1156   Pulse 63 10/12/21 1216   Resp 20 10/12/21 1216   SpO2 98 % 10/12/21 1211   Vitals shown include unvalidated device data.

## 2021-10-12 NOTE — ANESTHESIA PREPROCEDURE EVALUATION
Relevant Problems   No relevant active problems       Anesthetic History   No history of anesthetic complications            Review of Systems / Medical History  Patient summary reviewed, nursing notes reviewed and pertinent labs reviewed    Pulmonary  Within defined limits                 Neuro/Psych   Within defined limits           Cardiovascular  Within defined limits                     GI/Hepatic/Renal     GERD           Endo/Other  Within defined limits           Other Findings              Physical Exam    Airway  Mallampati: II  TM Distance: > 6 cm  Neck ROM: normal range of motion   Mouth opening: Normal     Cardiovascular  Regular rate and rhythm,  S1 and S2 normal,  no murmur, click, rub, or gallop             Dental  No notable dental hx       Pulmonary  Breath sounds clear to auscultation               Abdominal  GI exam deferred       Other Findings            Anesthetic Plan    ASA: 2  Anesthesia type: MAC            Anesthetic plan and risks discussed with: Patient

## 2021-10-12 NOTE — ROUTINE PROCESS
Christa Moreno.  1990  [id number]    Situation:  Verbal report received from: CHERISE Tejada RN  Procedure: Procedure(s):  ESOPHAGOGASTRODUODENOSCOPY (EGD)   :-    Background:    Preoperative diagnosis: Diarrhea, unspecified type [R19.7]  Epigastric pain [R10.13]  RLQ abdominal pain [R10.31]  Chest pain, unspecified type [R07.9]  RUQ pain [R10.11]  Postoperative diagnosis: duodenitis  submucosal lesion of esophagus    :  Dr. Angeli Koroma  Assistant(s): Endoscopy Technician-1: [facility]  Endoscopy RN-1: Hector Bryan RN    Specimens:   ID Type Source Tests Collected by Time Destination   1 : antrum biopsy Preservative Stomach, Antrum  Andrzej Garcia MD 10/12/2021 1144 Pathology   2 : distal esophagus Preservative Esophagus, Distal  Andrzej Garcia MD 10/12/2021 1144 Pathology     H. Pylori  yes    Assessment:  Intra-procedure medications Anesthesia gave intra-procedure sedation and medications, see anesthesia flow sheet yes    Intravenous fluids: NS@ KVO     Vital signs stable     Abdominal assessment: round and soft     Recommendation:  Discharge patient per MD order.     Family or Friend   Permission to share finding with family or friend no

## 2021-10-12 NOTE — PROCEDURES
[address]  [address]                 NAME:  Saywer Hawley. :   1990   MRN:   [id number]     Date/Time:  10/12/2021 11:46 AM    Esophagogastroduodenoscopy (EGD) Procedure Note    :  Melita Storey MD    Staff: Endoscopy Technician-1: Thuan Ching  Endoscopy RN-1: Juan Lyon RN     Referring Provider:  Christa Delgadillo MD    Anethesia/Sedation:  MAC anesthesia Propofol    Preoperative diagnosis: Diarrhea, unspecified type [R19.7]  Epigastric pain [R10.13]  RLQ abdominal pain [R10.31]  Chest pain, unspecified type [R07.9]  RUQ pain [R10.11]    Postoperative diagnosis: duodenitis  submucosal lesion of esophagus    Procedure Details     After infom consent was obtained for the procedure, with all risks and benefits of procedure explained the patient was taken to the endoscopy suite and placed in the left lateral decubitus position. Following sequential administration of sedation as per above, the [id number] gastroscope was inserted into the mouth and advanced under direct vision to second portion of the duodenum. A careful inspection was made as the gastroscope was withdrawn, including a retroflexed view of the proximal stomach; findings and interventions are described below. Findings:  Esophagus:6 mm submucosal lesion in distal esophagus biopsied  Stomach:normal mucosa, SAHIL and biopsies done  Duodenum/jejunum:erosive duodenitis in bulb      Therapies:  none    Specimens: gastric, esophageal bx           EBL: None    Complications:   None; patient tolerated the procedure well. Impression:    See Postoperative diagnosis above    Recommendations:  -Acid suppression with a proton pump inhibitor. , -Await pathology. , -No NSAIDS    Discharge disposition:  Home in the company of  when able to ambulate    Melita Storey MD

## 2021-10-13 ENCOUNTER — APPOINTMENT (OUTPATIENT)
Dept: PHYSICAL THERAPY | Age: 31
End: 2021-10-13
Payer: MEDICAID

## 2021-10-22 ENCOUNTER — APPOINTMENT (OUTPATIENT)
Dept: PHYSICAL THERAPY | Age: 31
End: 2021-10-22
Payer: MEDICAID

## 2021-10-26 ENCOUNTER — HOSPITAL ENCOUNTER (OUTPATIENT)
Dept: PHYSICAL THERAPY | Age: 31
Discharge: HOME | End: 2021-10-26
Payer: MEDICAID

## 2021-10-26 PROCEDURE — 97110 THERAPEUTIC EXERCISES: CPT | Performed by: PHYSICAL THERAPIST

## 2021-10-26 NOTE — PROGRESS NOTES
PT DAILY TREATMENT NOTE - [facility] 2-15    Patient Name: Cj Calvin. Date:10/26/2021  : 1990  [x]  Patient  Verified  Payor: Zeb Valdez / Plan: [id number] / Product Type: Managed Care Medicaid /    In time: 147p   Out time: 245p  Total Treatment Time (min): 58  Total Timed Codes (min): 58  1:1 Treatment Time ( only): --   Visit #:  4  Treatment Area: Left hip pain [M25.552]    SUBJECTIVE  Pain Level (0-10 scale): 0/10  Any medication changes, allergies to medications, adverse drug reactions, diagnosis change, or new procedure performed?: [x] No    [] Yes (see summary sheet for update)  Subjective functional status/changes:   [] No changes reported  Pt reports that he had to miss a lot of his sessions due to a passing of a friend. He would like to resume the previously established Plan of Care. OBJECTIVE      58 min Therapeutic Exercise:  [x] See flow sheet :   Rationale: increase ROM, increase strength, improve coordination, improve balance and increase proprioception to improve the patients ability to increase hip stability            With   [] TE   [] TA   [] Neuro   [] SC   [] other: Patient Education: [x] Review HEP    [] Progressed/Changed HEP based on:   [] positioning   [] body mechanics   [] transfers   [] heat/ice application    [] other:      Other Objective/Functional Measures: --     Pain Level (0-10 scale) post treatment: 0/10    ASSESSMENT/Changes in Function:   Able to perform therapeutic exercise without increase of pain. Plan to advance as tolerated. Patient will continue to benefit from skilled PT services to modify and progress therapeutic interventions, address functional mobility deficits, address ROM deficits, address strength deficits, analyze and address soft tissue restrictions, analyze and cue movement patterns, analyze and modify body mechanics/ergonomics and assess and modify postural abnormalities to attain remaining goals.      []  See Plan of Care  [] See progress note/recertification  []  See Discharge Summary         Progress towards goals / Updated goals:  Short Term Goals: To be accomplished in 4-6 treatments:  1) Pt will be Independent with HEP  2) Pt will be able to Sit greater than 15 minutes without pain  3) Pt will be able to Stand greater than 15 minutes without increase of pain  4) Pt will be able to Ambulate greater than 1 mile without increase of pain     Long Term Goals: To be accomplished in 8-12 treatments:  1)  Pt will be able to Sit greater than 45 minutes without pain  2)Pt will be able to retrieve item form ground without pain  3) Pt will be able to carry >/= 20 lbs without pain                        Frequency / Duration: Patient to be seen 1-2 times per week for 8-12 treatments.     PLAN  [x]  Upgrade activities as tolerated     [x]  Continue plan of care  []  Update interventions per flow sheet       []  Discharge due to:_  []  Other:_      Juliocesar Doss, PT, DPT,  10/26/2021

## 2021-10-28 ENCOUNTER — OFFICE VISIT (OUTPATIENT)
Dept: INTERNAL MEDICINE CLINIC | Age: 31
End: 2021-10-28
Payer: MEDICAID

## 2021-10-28 VITALS
BODY MASS INDEX: 35.25 KG/M2 | TEMPERATURE: 98.4 F | HEART RATE: 58 BPM | WEIGHT: 266 LBS | OXYGEN SATURATION: 97 % | SYSTOLIC BLOOD PRESSURE: 129 MMHG | HEIGHT: 73 IN | DIASTOLIC BLOOD PRESSURE: 81 MMHG | RESPIRATION RATE: 16 BRPM

## 2021-10-28 DIAGNOSIS — G57.02 PIRIFORMIS SYNDROME OF LEFT SIDE: Primary | ICD-10-CM

## 2021-10-28 PROCEDURE — 99214 OFFICE O/P EST MOD 30 MIN: CPT | Performed by: FAMILY MEDICINE

## 2021-10-28 NOTE — PROGRESS NOTES
Chief Complaint   Patient presents with    Hip Pain     Patient is here for a follow up of right hip      he is a 33y.o. year old male who presents for follow up of injury. Follow Up Pain Assessment Encounter      Onset of Symptoms: Patient states he has had pain for months   ________________________________________________________________________  Description: Pain is now  is unchanged      Pain Scale:(1-10): 4  Duration:  continuous  Radiation: hip  What makes it better?: heat  What makes it worse?:sitting  Medications tried: ibuprofen  Modalities tried: PT         Reviewed and agree with Nurse Note and duplicated in this note. Reviewed PmHx, RxHx, FmHx, SocHx, AllgHx and updated and dated in the chart. No family history on file. Past Medical History:   Diagnosis Date    GERD (gastroesophageal reflux disease)       Social History     Socioeconomic History    Marital status: SINGLE     Spouse name: Not on file    Number of children: Not on file    Years of education: Not on file    Highest education level: Not on file   Tobacco Use    Smoking status: Never Smoker    Smokeless tobacco: Never Used   Substance and Sexual Activity    Alcohol use: Yes    Drug use: Yes     Types: Marijuana    Sexual activity: Yes     Social Determinants of Health     Financial Resource Strain:     Difficulty of Paying Living Expenses:    Food Insecurity:     Worried About Running Out of Food in the Last Year:     [address] in the Last Year:    Transportation Needs:     Lack of Transportation (Medical):      Lack of Transportation (Non-Medical):    Physical Activity:     Days of Exercise per Week:     Minutes of Exercise per Session:    Stress:     Feeling of Stress :    Social Connections:     Frequency of Communication with Friends and Family:     Frequency of Social Gatherings with Friends and Family:     Attends Zoroastrian Services:     Active Member of Clubs or Organizations:     Attends [employer] or Organization Meetings:     Marital Status:         Review of Systems - negative except as listed above      Objective:     Vitals:    10/28/21 1147   BP: 129/81   Pulse: (!) 58   Resp: 16   Temp: 98.4 °F (36.9 °C)   SpO2: 97%   Weight: 266 lb (120.7 kg)   Height: 6' 1\" (1.854 m)       Physical Examination: General appearance - alert, well appearing, and in no distress  Back exam - full range of motion, no tenderness, palpable spasm or pain on motion, negative straight-leg raise bilaterally , normal reflexes and strength bilateral lower extremities, sensory exam intact bilateral lower extremities  Neurological - alert, oriented, normal speech, no focal findings or movement disorder noted  Musculoskeletal -   MSK - Hip left:    Deformity: None    ROM:     Flexion: Normal    Extension: Normal     Internal/external rotation: Normal      Gait: Normal       Palpation:    L1-L5: Negative tenderness    Sacrum: Negative tenderness    Coccyx: Negativetenderness    Left Paraspinal: Negativetenderness    Right Paraspinal: Negativetenderness    Greater trochanter: Negativetenderness    Ischial Tuberosity: Negativetenderness    Piriformis: Positivetenderness       Strength (0-5/5)    Hip Flexion:  Left: 5/5  Right: 5/5    Hip Extension: Left: 5/5  Right: 5/5    Hip Abduction: Left: 5/5  Right: 5/5    Hip Adduction: Left: 5/5  Right: 5/5    Knee Extension: Left: 5/5  Right: 5/5    Knee Flexion:  Left: 5/5  Right: 5/5    One leg squat:       Sensation: Intact, no deficits      DTR:    Patella:2       Achilles: 2   Special test:    Straight leg:Negative     Cecils:Negative     Piriformis:Negative     FADIR is Negative      Extremities - peripheral pulses normal, no pedal edema, no clubbing or cyanosis  Skin - normal coloration and turgor, no rashes, no suspicious skin lesions noted      Assessment/ Plan:   Diagnoses and all orders for this visit:    1.  Piriformis syndrome of left side    Patient has only been to 3 sessions with physical therapy, recommend continued physical therapy at this time. May consider imaging if no resolution after 4 weeks       Pathophysiology, recovery and rehabilitation process discussed and questions answered   Counseling for 30 Minutes of the total visit duration   Pictures and figures used as necessary   Provided reassurance   Monitor response to Physical Therapy   Recommend  lower impact activities-walking, Eliptical, Nordic Track, cycling or swimming   Follow up in 4 week(s)              I have discussed the diagnosis with the patient and the intended plan as seen in the above orders. The patient has received an after-visit summary and questions were answered concerning future plans. Medication Side Effects and Warnings were discussed with patient,  Patient Labs were reviewed and or requested, and  Patient Past Records were reviewed and or requested  yes     Pt agrees to call or return to clinic and/or go to closest ER with any worsening of symptoms. This may include, but not limited to increased fever (>100.4) with NSAIDS or Tylenol, increased edema, confusion, rash, worsening of presenting symptoms. Please note that this dictation was completed with [employer], the computer voice recognition software. Quite often unanticipated grammatical, syntax, homophones, and other interpretive errors are inadvertently transcribed by the computer software. Please disregard these errors. Please excuse any errors that have escaped final proofreading. Thank you.

## 2021-11-08 ENCOUNTER — HOSPITAL ENCOUNTER (OUTPATIENT)
Dept: PHYSICAL THERAPY | Age: 31
Discharge: HOME | End: 2021-11-08
Payer: MEDICAID

## 2021-11-08 PROCEDURE — 97110 THERAPEUTIC EXERCISES: CPT

## 2021-11-08 NOTE — PROGRESS NOTES
PT DAILY TREATMENT NOTE - [facility] 2-15    Patient Name: Christa Moreno. Date:2021  : 1990  [x]  Patient  Verified  Payor: Alfred Mullen / Plan: Slim Rivera / Product Type: Managed Care Medicaid /    In time: 11:36A   Out time: 12:42P  Total Treatment Time (min): 66  Total Timed Codes (min): 56  1:1 Treatment Time ( [address] only): --   Visit #:  5    Treatment Area: Left hip pain [M25.552]    SUBJECTIVE  Pain Level (0-10 scale): 0/10  Any medication changes, allergies to medications, adverse drug reactions, diagnosis change, or new procedure performed?: [x] No    [] Yes (see summary sheet for update)  Subjective functional status/changes:   [] No changes reported  Pt reported doing okay.     OBJECTIVE    Modality rationale: decrease edema, decrease inflammation and decrease pain to improve the patients ability to decrease L hip pain   Min Type Additional Details       [] Estim: []Att   []Unatt    []TENS instruct                  []IFC  []Premod   []NMES                     []Other:  []w/US   []w/ice   []w/heat  Position:  Location:       []  Traction: [] Cervical       []Lumbar                       [] Prone          []Supine                       []Intermittent   []Continuous Lbs:  [] before manual  [] after manual  []w/heat    []  Ultrasound: []Continuous   [] Pulsed                       at: []1MHz   []3MHz Location:  W/cm2:    [] Paraffin         Location:   []w/heat   10 [x]  Ice     []  Heat  []  Ice massage Position: R S/L  Location: L hip    []  Laser  []  Other: Position:  Location:      []  Vasopneumatic Device Pressure:       [] lo [] med [] hi   Temperature:      [x] Skin assessment post-treatment:  [x]intact []redness- no adverse reaction    []redness  adverse reaction:         56 min Therapeutic Exercise:  [x] See flow sheet :   Rationale: increase ROM, increase strength, improve coordination, improve balance and increase proprioception to improve the patients ability to increase hip stability            With   [] TE   [] TA   [] Neuro   [] SC   [] other: Patient Education: [x] Review HEP    [] Progressed/Changed HEP based on:   [] positioning   [] body mechanics   [] transfers   [] heat/ice application    [] other:      Other Objective/Functional Measures: --     Pain Level (0-10 scale) post treatment: 0/10    ASSESSMENT/Changes in Function:   Pt challenged with today's session added in rebounder SLS toss. Pt able to maintain SLS for 4-5 tosses before losing positioning. Patient will continue to benefit from skilled PT services to modify and progress therapeutic interventions, address functional mobility deficits, address ROM deficits, address strength deficits, analyze and address soft tissue restrictions, analyze and cue movement patterns, analyze and modify body mechanics/ergonomics and assess and modify postural abnormalities to attain remaining goals. []  See Plan of Care  []  See progress note/recertification  []  See Discharge Summary         Progress towards goals / Updated goals:  Short Term Goals: To be accomplished in 4-6 treatments:  1) Pt will be Independent with HEP  2) Pt will be able to Sit greater than 15 minutes without pain  3) Pt will be able to Stand greater than 15 minutes without increase of pain  4) Pt will be able to Ambulate greater than 1 mile without increase of pain     Long Term Goals: To be accomplished in 8-12 treatments:  1)  Pt will be able to Sit greater than 45 minutes without pain  2)Pt will be able to retrieve item form ground without pain  3) Pt will be able to carry >/= 20 lbs without pain                        Frequency / Duration: Patient to be seen 1-2 times per week for 8-12 treatments.     PLAN  [x]  Upgrade activities as tolerated     [x]  Continue plan of care  []  Update interventions per flow sheet       []  Discharge due to:_  []  Other:_      Lois Ingram PTA,OPTA  11/8/2021

## 2021-11-10 ENCOUNTER — HOSPITAL ENCOUNTER (OUTPATIENT)
Dept: PHYSICAL THERAPY | Age: 31
Discharge: HOME | End: 2021-11-10
Payer: MEDICAID

## 2021-11-10 PROCEDURE — 97110 THERAPEUTIC EXERCISES: CPT

## 2021-11-10 NOTE — PROGRESS NOTES
PT DAILY TREATMENT NOTE - [facility] 2-15    Patient Name: Alfred Guzman.   Date:11/10/2021  : 1990  [x]  Patient  Verified  Payor: Carolin Urbina / Plan: Case Rover / Product Type: Managed Care Medicaid /    In time: 1:07P   Out time: 2:05P  Total Treatment Time (min): 58  Total Timed Codes (min): 48  1:1 Treatment Time ( [address] only): --   Visit #:  6    Treatment Area: Left hip pain [M25.552]    SUBJECTIVE  Pain Level (0-10 scale): 0/10  Any medication changes, allergies to medications, adverse drug reactions, diagnosis change, or new procedure performed?: [x] No    [] Yes (see summary sheet for update)  Subjective functional status/changes:   [] No changes reported  Pt reports feeling fine after last session      OBJECTIVE    Modality rationale: decrease edema, decrease inflammation and decrease pain to improve the patients ability to decrease L hip pain   Min Type Additional Details       [] Estim: []Att   []Unatt    []TENS instruct                  []IFC  []Premod   []NMES                     []Other:  []w/US   []w/ice   []w/heat  Position:  Location:       []  Traction: [] Cervical       []Lumbar                       [] Prone          []Supine                       []Intermittent   []Continuous Lbs:  [] before manual  [] after manual  []w/heat    []  Ultrasound: []Continuous   [] Pulsed                       at: []1MHz   []3MHz Location:  W/cm2:    [] Paraffin         Location:   []w/heat   10 [x]  Ice     []  Heat  []  Ice massage Position: supine  Location: B hip    []  Laser  []  Other: Position:  Location:      []  Vasopneumatic Device Pressure:       [] lo [] med [] hi   Temperature:      [x] Skin assessment post-treatment:  [x]intact []redness- no adverse reaction    []redness  adverse reaction:         48 min Therapeutic Exercise:  [x] See flow sheet :   Rationale: increase ROM, increase strength, improve coordination, improve balance and increase proprioception to improve the patients ability to increase hip stability            With   [] TE   [] TA   [] Neuro   [] SC   [] other: Patient Education: [x] Review HEP    [] Progressed/Changed HEP based on:   [] positioning   [] body mechanics   [] transfers   [] heat/ice application    [] other:      Other Objective/Functional Measures: --     Pain Level (0-10 scale) post treatment: 0/10    ASSESSMENT/Changes in Function:   Pt continues to show increase in stabilization with added strengthening exercises. Still challenged with form for BOSU squats. Patient will continue to benefit from skilled PT services to modify and progress therapeutic interventions, address functional mobility deficits, address ROM deficits, address strength deficits, analyze and address soft tissue restrictions, analyze and cue movement patterns, analyze and modify body mechanics/ergonomics and assess and modify postural abnormalities to attain remaining goals. []  See Plan of Care  []  See progress note/recertification  []  See Discharge Summary         Progress towards goals / Updated goals:  Short Term Goals: To be accomplished in 4-6 treatments:  1) Pt will be Independent with HEP  2) Pt will be able to Sit greater than 15 minutes without pain  3) Pt will be able to Stand greater than 15 minutes without increase of pain  4) Pt will be able to Ambulate greater than 1 mile without increase of pain     Long Term Goals: To be accomplished in 8-12 treatments:  1)  Pt will be able to Sit greater than 45 minutes without pain  2)Pt will be able to retrieve item form ground without pain  3) Pt will be able to carry >/= 20 lbs without pain                        Frequency / Duration: Patient to be seen 1-2 times per week for 8-12 treatments.     PLAN  [x]  Upgrade activities as tolerated     [x]  Continue plan of care  []  Update interventions per flow sheet       []  Discharge due to:_  []  Other:_      Thomas Hernandez, MEIR, OPTA  11/10/2021

## 2021-11-16 ENCOUNTER — HOSPITAL ENCOUNTER (OUTPATIENT)
Dept: PHYSICAL THERAPY | Age: 31
Discharge: HOME | End: 2021-11-16
Payer: MEDICAID

## 2021-11-16 PROCEDURE — 97110 THERAPEUTIC EXERCISES: CPT | Performed by: PHYSICAL THERAPIST

## 2021-11-16 NOTE — PROGRESS NOTES
PT DAILY TREATMENT NOTE - [facility] 2-15    Patient Name: Iglesia Moses. Date:2021  : 1990  [x]  Patient  Verified  Payor: Henry Baez / Plan: Go Barkley / Product Type: Managed Care Medicaid /    In time: 150p   Out time: 250p  Total Treatment Time (min): 60  Total Timed Codes (min): 50  1:1 Treatment Time ( [address] only): --   Visit #:  7    Treatment Area: Left hip pain [M25.552]    SUBJECTIVE  Pain Level (0-10 scale): 0/10  Any medication changes, allergies to medications, adverse drug reactions, diagnosis change, or new procedure performed?: [x] No    [] Yes (see summary sheet for update)  Subjective functional status/changes:   [] No changes reported  Pt reports he has been able to do more this past weekend.       OBJECTIVE    Modality rationale: decrease edema, decrease inflammation and decrease pain to improve the patients ability to decrease L hip pain   Min Type Additional Details       [] Estim: []Att   []Unatt    []TENS instruct                  []IFC  []Premod   []NMES                     []Other:  []w/US   []w/ice   []w/heat  Position:  Location:       []  Traction: [] Cervical       []Lumbar                       [] Prone          []Supine                       []Intermittent   []Continuous Lbs:  [] before manual  [] after manual  []w/heat    []  Ultrasound: []Continuous   [] Pulsed                       at: []1MHz   []3MHz Location:  W/cm2:    [] Paraffin         Location:   []w/heat   10 [x]  Ice     []  Heat  []  Ice massage Position: supine  Location: B hip    []  Laser  []  Other: Position:  Location:      []  Vasopneumatic Device Pressure:       [] lo [] med [] hi   Temperature:      [x] Skin assessment post-treatment:  [x]intact []redness- no adverse reaction    []redness  adverse reaction:         50 min Therapeutic Exercise:  [x] See flow sheet :   Rationale: increase ROM, increase strength, improve coordination, improve balance and increase proprioception to improve the patients ability to increase hip stability            With   [] TE   [] TA   [] Neuro   [] SC   [] other: Patient Education: [x] Review HEP    [] Progressed/Changed HEP based on:   [] positioning   [] body mechanics   [] transfers   [] heat/ice application    [] other:      Other Objective/Functional Measures: --     Pain Level (0-10 scale) post treatment: 0/10    ASSESSMENT/Changes in Function:   Difficulty with deep squat position on BOSU ball. Improved core stability control noted with bird dog exercise. Patient will continue to benefit from skilled PT services to modify and progress therapeutic interventions, address functional mobility deficits, address ROM deficits, address strength deficits, analyze and address soft tissue restrictions, analyze and cue movement patterns, analyze and modify body mechanics/ergonomics and assess and modify postural abnormalities to attain remaining goals. []  See Plan of Care  []  See progress note/recertification  []  See Discharge Summary         Progress towards goals / Updated goals:  Short Term Goals: To be accomplished in 4-6 treatments:  1) Pt will be Independent with HEP  2) Pt will be able to Sit greater than 15 minutes without pain  3) Pt will be able to Stand greater than 15 minutes without increase of pain  4) Pt will be able to Ambulate greater than 1 mile without increase of pain     Long Term Goals: To be accomplished in 8-12 treatments:  1)  Pt will be able to Sit greater than 45 minutes without pain  2)Pt will be able to retrieve item form ground without pain  3) Pt will be able to carry >/= 20 lbs without pain                        Frequency / Duration: Patient to be seen 1-2 times per week for 8-12 treatments.     PLAN  [x]  Upgrade activities as tolerated     [x]  Continue plan of care  []  Update interventions per flow sheet       []  Discharge due to:_  []  Other:_      Milton Hendrix, PT, DPT, OPTA  11/16/2021

## 2021-11-18 ENCOUNTER — HOSPITAL ENCOUNTER (OUTPATIENT)
Dept: PHYSICAL THERAPY | Age: 31
End: 2021-11-18
Payer: MEDICAID

## 2021-11-23 ENCOUNTER — APPOINTMENT (OUTPATIENT)
Dept: PHYSICAL THERAPY | Age: 31
End: 2021-11-23
Payer: MEDICAID

## 2021-12-01 ENCOUNTER — HOSPITAL ENCOUNTER (OUTPATIENT)
Dept: PHYSICAL THERAPY | Age: 31
Discharge: HOME | End: 2021-12-01
Payer: MEDICAID

## 2021-12-01 PROCEDURE — 97110 THERAPEUTIC EXERCISES: CPT | Performed by: PHYSICAL THERAPIST

## 2021-12-01 PROCEDURE — 97140 MANUAL THERAPY 1/> REGIONS: CPT | Performed by: PHYSICAL THERAPIST

## 2021-12-01 NOTE — PROGRESS NOTES
PT DAILY TREATMENT NOTE - [facility] 2-15    Patient Name: Felix Alvarado. Date:2021  : 1990  [x]  Patient  Verified  Payor: Chinedu Davison / Plan: Praveen Short / Product Type: Managed Care Medicaid /    In time: 3915D Out time: 1126p  Total Treatment Time (min): 60  Total Timed Codes (min): 60  1:1 Treatment Time ( only): --   Visit #:  8    Treatment Area: Left hip pain [M25.552]    SUBJECTIVE  Pain Level (0-10 scale): 0/10  Any medication changes, allergies to medications, adverse drug reactions, diagnosis change, or new procedure performed?: [x] No    [] Yes (see summary sheet for update)  Subjective functional status/changes:   [] No changes reported  Pt reports he has not been keeping up with his exercise consistently. Still has pain in left hip with prolonged sitting and standing. OBJECTIVE      50 min Therapeutic Exercise:  [x] See flow sheet :   Rationale: increase ROM, increase strength, improve coordination, improve balance and increase proprioception to improve the patients ability to increase hip stability    10 min Manual Therapy: STM/MFR L piriformis and gluts. MWM L piriformis with hip IR and ER    Rationale: decrease pain, increase ROM, increase tissue extensibility and decrease trigger points to improve the patients ability to increase mobility                        With   [] TE   [] TA   [] Neuro   [] SC   [] other: Patient Education: [x] Review HEP    [] Progressed/Changed HEP based on:   [] positioning   [] body mechanics   [] transfers   [] heat/ice application    [] other:      Other Objective/Functional Measures: --     Pain Level (0-10 scale) post treatment: 0/10    ASSESSMENT/Changes in Function:   Instructed in use of tennis ball to perform self piriformis massage. Adjusted HEP to include single limb RDL and dips. Plan to discharge at next session.   Patient will continue to benefit from skilled PT services to modify and progress therapeutic interventions, address functional mobility deficits, address ROM deficits, address strength deficits, analyze and address soft tissue restrictions, analyze and cue movement patterns, analyze and modify body mechanics/ergonomics and assess and modify postural abnormalities to attain remaining goals. []  See Plan of Care  []  See progress note/recertification  []  See Discharge Summary         Progress towards goals / Updated goals:  Short Term Goals: To be accomplished in 4-6 treatments:  1) Pt will be Independent with HEP Progressing  2) Pt will be able to Sit greater than 15 minutes without pain  Progressing  3) Pt will be able to Stand greater than 15 minutes without increase of pain Progressing  4) Pt will be able to Ambulate greater than 1 mile without increase of pain Progressing     Long Term Goals: To be accomplished in 8-12 treatments:  1)  Pt will be able to Sit greater than 45 minutes without pain  2)Pt will be able to retrieve item form ground without pain  3) Pt will be able to carry >/= 20 lbs without pain                        Frequency / Duration: Patient to be seen 1-2 times per week for 8-12 treatments.     PLAN  [x]  Upgrade activities as tolerated     [x]  Continue plan of care  []  Update interventions per flow sheet       []  Discharge due to:_  []  Other:_      Stefania Osborne, PT, DPT,  12/1/2021

## 2021-12-03 ENCOUNTER — HOSPITAL ENCOUNTER (OUTPATIENT)
Dept: PHYSICAL THERAPY | Age: 31
Discharge: HOME | End: 2021-12-03
Payer: MEDICAID

## 2021-12-03 PROCEDURE — 97110 THERAPEUTIC EXERCISES: CPT | Performed by: PHYSICAL THERAPIST

## 2021-12-03 NOTE — PROGRESS NOTES
PT DAILY TREATMENT NOTE - [facility] 2-15    Patient Name: Peg Sanchez. Date:12/3/2021  : 1990  [x]  Patient  Verified  Payor: Halima Pack / Plan: Reyna Walsh / Product Type: Managed Care Medicaid /    In time:  Out time:   Total Treatment Time (min): 55  Total Timed Codes (min): 55  1:1 Treatment Time ([address] only): --   Visit #:  9    Treatment Area: Left hip pain [M25.552]    SUBJECTIVE  Pain Level (0-10 scale): 0/10  Any medication changes, allergies to medications, adverse drug reactions, diagnosis change, or new procedure performed?: [x] No    [] Yes (see summary sheet for update)  Subjective functional status/changes:   [] No changes reported  Pt reports he noticed less pain with standing last since last session. OBJECTIVE      45 min Therapeutic Exercise:  [x] See flow sheet :   Rationale: increase ROM, increase strength, improve coordination, improve balance and increase proprioception to improve the patients ability to increase hip stability    10 min Manual Therapy: STM/MFR L piriformis and gluts. MWM L piriformis with hip IR and ER    Rationale: decrease pain, increase ROM, increase tissue extensibility and decrease trigger points to improve the patients ability to increase mobility                        With   [] TE   [] TA   [] Neuro   [] SC   [] other: Patient Education: [x] Review HEP    [] Progressed/Changed HEP based on:   [] positioning   [] body mechanics   [] transfers   [] heat/ice application    [] other:      Other Objective/Functional Measures: --     Pain Level (0-10 scale) post treatment: 0/10    ASSESSMENT/Changes in Function:   Demonstrated improved single limb control with Single limb RDL today. He has been instructed in stretching, self massage and strengthening program to continue on his own moving forward.  Recommend frequency fo 2-3 times per week x 2-3 more months on his own.     []  See Plan of Care  []  See progress note/recertification  [x] See Discharge Summary         Progress towards goals / Updated goals:  Short Term Goals: To be accomplished in 4-6 treatments:  1) Pt will be Independent with HEP MET  2) Pt will be able to Sit greater than 15 minutes without pain  MET  3) Pt will be able to Stand greater than 15 minutes without increase of pain MET  4) Pt will be able to Ambulate greater than 1 mile without increase of pain MET     Long Term Goals: To be accomplished in 8-12 treatments:  1)  Pt will be able to Sit greater than 45 minutes without pain Partially MET  2)Pt will be able to retrieve item form ground without pain MET  3) Pt will be able to carry >/= 20 lbs without pain  MET                      Frequency / Duration: Patient to be seen 1-2 times per week for 8-12 treatments.     PLAN  []  Upgrade activities as tolerated     []  Continue plan of care  []  Update interventions per flow sheet       [x]  Discharge due to:_ Completed goals  []  Other:_      Claudia Lobo, PT, DPT,  12/3/2021

## 2021-12-03 NOTE — PROGRESS NOTES
Physical Therapy at [facility],   a part of [facility]  [address] [address], [address] [place] [address], [address] [place] [address]  Phone: [phone]  Fax: [phone]    Medicaid Discharge Summary  2-15    Patient name: Dangelo Cantu. : 1990  Provider#: [id number]  Referral source: Kenya Mandujano MD      Medical/Treatment Diagnosis: Left hip pain [M25.552]     Prior Hospitalization: see medical history     Comorbidities: previous back injury  Prior Level of Function: complete 20 minutes of exercise at least 1-2 times a week  Medications: Verified on Patient Summary List     Start of Care: 2021                                                                              Onset Date: 2020         Visits from Start of Care: 9      Missed Visits: 4  Reporting Period : 2021 to 12/3/2021    Progress towards goals / Updated goals:  Short Term Goals: To be accomplished in 4-6 treatments:  1) Pt will be Independent with HEP MET  2) Pt will be able to Sit greater than 15 minutes without pain  MET  3) Pt will be able to Stand greater than 15 minutes without increase of pain MET  4) Pt will be able to Ambulate greater than 1 mile without increase of pain MET     Long Term Goals: To be accomplished in 8-12 treatments:  1)  Pt will be able to Sit greater than 45 minutes without pain Partially MET  2)Pt will be able to retrieve item form ground without pain MET  3) Pt will be able to carry >/= 20 lbs without pain  MET         ASSESSMENT/SUMMARY OF CARE:  Margaret Serna reports he noticed less pain with standing last since last session. Demonstrated improved single limb control with Single limb RDL today. He has been instructed in stretching, self massage and strengthening program to continue on his own moving forward. Recommend frequency fo 2-3 times per week x 2-3 more months on his own.   FOTO Functional Measure: Intake 80/100   Discharge 84/100          RECOMMENDATIONS:  [x]Discontinue therapy: [x]Patient has reached or is progressing toward set goals      []Patient is non-compliant or has abdicated      []Due to lack of appreciable progress towards set goals    Juanita Bello, PT, DPT 12/3/2021     ______________________________________________________________________    NOTE TO PHYSICIAN:  Please complete the following and fax to:  Physical Therapy at [facility], a part of [facility] [place]: Fax: [phone] . Anna Canales Retain this original for your records. If you are unable to process this request in 24 hours, please contact our office.      Physician's Signature:____________________  Date:____________Time:_________           Ida Hensley MD

## 2021-12-14 ENCOUNTER — OFFICE VISIT (OUTPATIENT)
Dept: INTERNAL MEDICINE CLINIC | Age: 31
End: 2021-12-14
Payer: MEDICAID

## 2021-12-14 VITALS
SYSTOLIC BLOOD PRESSURE: 112 MMHG | DIASTOLIC BLOOD PRESSURE: 74 MMHG | WEIGHT: 267 LBS | BODY MASS INDEX: 35.39 KG/M2 | OXYGEN SATURATION: 96 % | RESPIRATION RATE: 16 BRPM | TEMPERATURE: 98.3 F | HEIGHT: 73 IN | HEART RATE: 71 BPM

## 2021-12-14 DIAGNOSIS — G57.02 PIRIFORMIS SYNDROME OF LEFT SIDE: ICD-10-CM

## 2021-12-14 DIAGNOSIS — M94.0 COSTOCHONDRITIS: Primary | ICD-10-CM

## 2021-12-14 PROCEDURE — 99214 OFFICE O/P EST MOD 30 MIN: CPT | Performed by: FAMILY MEDICINE

## 2021-12-14 NOTE — PROGRESS NOTES
Chief Complaint   Patient presents with    Hip Pain     Patient is here for left hip issues     he is a 33y.o. year old male who presents for follow up of injury. Follow Up Pain Assessment Encounter      Onset of Symptoms: Patient states he has had pain for months  ________________________________________________________________________  Description: Pain is now  has slightly improved      Pain Scale:(1-10): 2  Duration:  intermittent  Radiation: hip  What makes it better?: heat, lying down and OTC meds  What makes it worse?:exercise and walking  Medications tried: ibuprofen  Modalities tried: PT        Reviewed and agree with Nurse Note and duplicated in this note. Reviewed PmHx, RxHx, FmHx, SocHx, AllgHx and updated and dated in the chart. No family history on file. Past Medical History:   Diagnosis Date    GERD (gastroesophageal reflux disease)       Social History     Socioeconomic History    Marital status: SINGLE   Tobacco Use    Smoking status: Never Smoker    Smokeless tobacco: Never Used   Substance and Sexual Activity    Alcohol use:  Yes    Drug use: Yes     Types: Marijuana    Sexual activity: Yes        Review of Systems - negative except as listed above      Objective:     Vitals:    12/14/21 1455   Resp: 16   Weight: 267 lb (121.1 kg)   Height: 6' 1\" (1.854 m)       Physical Examination: General appearance - alert, well appearing, and in no distress  Chest - clear to auscultation, no wheezes, rales or rhonchi, symmetric air entry  Heart - normal rate, regular rhythm, normal S1, S2, no murmurs, rubs, clicks or gallops  Abdomen - soft, nontender, nondistended, no masses or organomegaly  Back exam - full range of motion, no tenderness, palpable spasm or pain on motion  Neurological - alert, oriented, normal speech, no focal findings or movement disorder noted  Musculoskeletal - no joint tenderness, deformity or swelling  Extremities - peripheral pulses normal, no pedal edema, no clubbing or cyanosis  Skin - normal coloration and turgor, no rashes, no suspicious skin lesions noted     Assessment/ Plan:   Diagnoses and all orders for this visit:    1. Costochondritis  -     REFERRAL TO PHYSICAL THERAPY  Patient has seen GI and cardiology, costochondritis likely diagnosis. Will recommend at this point physical therapy and anti-inflammatories  2. Piriformis syndrome of left side    Piriformis wrist pain has resolved, continue with home exercises      Pathophysiology, recovery and rehabilitation process discussed and questions answered   Counseling for 30 Minutes of the total visit duration   Pictures and figures used as necessary   Provided reassurance   Monitor response to Physical Therapy   Recommend activity modification   Recommend  lower impact activities-walking, Eliptical, Nordic Track, cycling or swimming               I have discussed the diagnosis with the patient and the intended plan as seen in the above orders. The patient has received an after-visit summary and questions were answered concerning future plans. Medication Side Effects and Warnings were discussed with patient,  Patient Labs were reviewed and or requested, and  Patient Past Records were reviewed and or requested  yes     Pt agrees to call or return to clinic and/or go to closest ER with any worsening of symptoms. This may include, but not limited to increased fever (>100.4) with NSAIDS or Tylenol, increased edema, confusion, rash, worsening of presenting symptoms. Please note that this dictation was completed with [employer], the computer voice recognition software. Quite often unanticipated grammatical, syntax, homophones, and other interpretive errors are inadvertently transcribed by the computer software. Please disregard these errors. Please excuse any errors that have escaped final proofreading. Thank you.

## 2022-01-03 ENCOUNTER — APPOINTMENT (OUTPATIENT)
Dept: PHYSICAL THERAPY | Age: 32
End: 2022-01-03

## 2022-05-17 ENCOUNTER — OFFICE VISIT (OUTPATIENT)
Dept: INTERNAL MEDICINE CLINIC | Age: 32
End: 2022-05-17
Payer: MEDICAID

## 2022-05-17 VITALS
HEIGHT: 73 IN | HEART RATE: 61 BPM | SYSTOLIC BLOOD PRESSURE: 135 MMHG | TEMPERATURE: 98.1 F | OXYGEN SATURATION: 98 % | DIASTOLIC BLOOD PRESSURE: 88 MMHG | RESPIRATION RATE: 16 BRPM | WEIGHT: 269 LBS | BODY MASS INDEX: 35.65 KG/M2

## 2022-05-17 DIAGNOSIS — M75.82 ROTATOR CUFF TENDONITIS, LEFT: Primary | ICD-10-CM

## 2022-05-17 DIAGNOSIS — M54.9 UPPER BACK PAIN ON LEFT SIDE: ICD-10-CM

## 2022-05-17 DIAGNOSIS — M25.512 ACUTE PAIN OF LEFT SHOULDER: ICD-10-CM

## 2022-05-17 DIAGNOSIS — M94.0 COSTOCHONDRITIS: ICD-10-CM

## 2022-05-17 PROCEDURE — 73030 X-RAY EXAM OF SHOULDER: CPT | Performed by: FAMILY MEDICINE

## 2022-05-17 PROCEDURE — 99214 OFFICE O/P EST MOD 30 MIN: CPT | Performed by: FAMILY MEDICINE

## 2022-05-17 RX ORDER — NAPROXEN 500 MG/1
500 TABLET ORAL 2 TIMES DAILY WITH MEALS
Qty: 30 TABLET | Refills: 2 | Status: SHIPPED | OUTPATIENT
Start: 2022-05-17

## 2022-05-17 NOTE — PROGRESS NOTES
Chief Complaint   Patient presents with    Shoulder Pain     Patient is here for left shoulder pain     Foot Pain     Patient is here for right foot pain      he is a 27y.o. year old male who presents for evaluation of right foot and left shoulder pain   Pain Assessment Encounter      Ricardo Reeves.  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 Reviewed PmHx, RxHx, FmHx, SocHx, AllgHx and updated and dated in the chart. No family history on file. Past Medical History:   Diagnosis Date    GERD (gastroesophageal reflux disease)       Social History     Socioeconomic History    Marital status: SINGLE   Tobacco Use    Smoking status: Never Smoker    Smokeless tobacco: Never Used   Substance and Sexual Activity    Alcohol use:  Yes    Drug use: Yes     Types: Marijuana    Sexual activity: Yes        Review of Systems - negative except as listed above      Objective:     Vitals:    05/17/22 0901   BP: (!) 141/91   Pulse: 61   Resp: 16   Temp: 98.1 °F (36.7 °C)   SpO2: 98%   Weight: 269 lb (122 kg)   Height: 6' 1\" (1.854 m)       Physical Examination: General appearance - alert, well appearing, and in no distress  Chest - clear to auscultation, no wheezes, rales or rhonchi, symmetric air entry  Heart - normal rate, regular rhythm, normal S1, S2, no murmurs, rubs, clicks or gallops  Abdomen - soft, nontender, nondistended, no masses or organomegaly  Back exam - full range of motion, no tenderness, palpable spasm or pain on motion  Neurological - alert, oriented, normal speech, no focal findings or movement disorder noted  Musculoskeletal - The left shoulder is  normal to inspection. The patient has diminished range with pain  . The shoulderis not tender to palpation . Bicep tendon: non-tender  The NEER test is negative. The Copeland test:is positive   The Cross over test:is  negative. The Empty Can test:is  negative. Stressed ext rotation is:  negative. Stressed int rotation: negative. The Apprehension Sign is negative. The Lift off test is:  negative   Examination reveals the Painful Arch:  positive. The Labral Test is:  negative. The Sulcus Sign is:  negative.     Extremities - peripheral pulses normal, no pedal edema, no clubbing or cyanosis  Skin - normal coloration and turgor, no rashes, no suspicious skin lesions noted   Indications for study: left shoulder pain      A limited  musculoskeletal ultrasound examination was performed on the left shoulder with the following findings: Biceps (LHB) tendon - long:  normal echogenic, linearly compact fibrillar appearance   Biceps (LHB) tendon - trans:  normal echogenic, tessellate compact fibrillar appearance   Subscapularis tendon - long: normal echogenic, linearly compact fibrillar birds-beak appearance   Subscapularis tendon - trans:  normal echogenic, tessellate compact fibrillar three-bundled appearance   Acromioclavicular joint - trans: normal joint-space without effusion or intra-articular debris   Subacromial (SA) impingement testing: Decreased maintenance of SA space and unrestricted supraspinatus glide  Supraspinatus tendon - long: normal echogenic, linearly compact fibrillar birds-beak appearance   Infraspinatus tendon - long: normal echogenic, linearly compact fibrillar birds-beak appearance   Teres minor tendon - long: normal echogenic, linearly compact fibrillar birds-beak appearance     Impression: Shoulder impingement, otherwise normal    Scanned and Interpreted by Mallika Martinez MD [facility]          Assessment/ Plan:   Diagnoses and all orders for this visit:    1. Rotator cuff tendonitis, left  -     REFERRAL TO PHYSICAL THERAPY  -     AMB POC US,EXTREMITY,NONVASCULAR,REAL-TIME IMAGE,LIMITED    2. Acute pain of left shoulder  -     XR SHOULDER LT AP/LAT MIN 2 V; Future  -     AMB POC US,EXTREMITY,NONVASCULAR,REAL-TIME IMAGE,LIMITED    3. Costochondritis  -     REFERRAL TO PHYSICAL THERAPY  -     AMB POC US,EXTREMITY,NONVASCULAR,REAL-TIME IMAGE,LIMITED    Other orders  -     naproxen (NAPROSYN) 500 mg tablet; Take 1 Tablet by mouth two (2) times daily (with meals). Pathophysiology, recovery and rehabilitation process discussed and questions answered   Counseling for 30 Minutes of the total visit duration   Pictures and figures used as necessary   Provided reassurance   Handouts provided and reviewed with patient for shoulder pain  Monitor response to injection   Discussed steroid side effects of fat atrophy, hypopigmentation, steroid flare or infection   Monitor response to Physical Therapy   Recommend activity modification   Recommend  lower impact activities-walking, Eliptical, Nordic Track, cycling or swimming              1) Remember to stay active and/or exercise regularly (I suggest 30-45 minutes daily)   2) For reliable dietary information, go to www. [employer]AT[employer].org. You may wish to consider seeing the nutritionist at [facility] [phone], also consider the [address] I have discussed the diagnosis with the patient and the intended plan as seen in the above orders. The patient has received an after-visit summary and questions were answered concerning future plans.      Medication Side Effects and Warnings were discussed with patient,  Patient Labs were reviewed and or requested, and  Patient Past Records were reviewed and or requested  yes      Pt agrees to call or return to clinic and/or go to closest ER with any worsening of symptoms. This may include, but not limited to increased fever (>100.4) with NSAIDS or Tylenol, increased edema, confusion, rash, worsening of presenting symptoms. Please note that this dictation was completed with [employer], the computer voice recognition software. Quite often unanticipated grammatical, syntax, homophones, and other interpretive errors are inadvertently transcribed by the computer software. Please disregard these errors. Please excuse any errors that have escaped final proofreading. Thank you.

## 2022-05-31 ENCOUNTER — HOSPITAL ENCOUNTER (OUTPATIENT)
Dept: PHYSICAL THERAPY | Age: 32
Discharge: HOME | End: 2022-05-31
Payer: MEDICAID

## 2022-05-31 PROCEDURE — 97161 PT EVAL LOW COMPLEX 20 MIN: CPT | Performed by: PHYSICAL THERAPIST

## 2022-05-31 NOTE — THERAPY EVALUATION
Physical Therapy at [facility],   a part of [address]  [address] [address], [address] [place] [address], [address]  Phone: [phone]  Fax: [phone]    Plan of Care/Statement of Necessity for Physical Therapy Services  2-15    Patient name: Dianna Goff. 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 Assessment/ key information: Patient reports with 3 month history of L shoulder pain limiting function. Symptoms consistent with a posterior impingement. Also has 3-4 year history of sterno-costal pain consistent with costochondritis. Noted some painful ROM, capsular tightness, and forward, rounded posture contributing to symptoms.      Evaluation Complexity History MEDIUM  Complexity : 1-2 comorbidities / personal factors will impact the outcome/ POC ; Examination HIGH Complexity : 4+ Standardized tests and measures addressing body structure, function, activity limitation and / or participation in recreation  ;Presentation LOW Complexity : Stable, uncomplicated  ;Clinical Decision Making MEDIUM Complexity : FOTO score of 26-74  Overall Complexity Rating: LOW     Problem List: pain affecting function, decrease ROM, decrease strength, decrease ADL/ functional abilitiies, decrease activity tolerance and decrease flexibility/ joint mobility   Treatment Plan may include any combination of the following: Therapeutic exercise, Therapeutic activities, Neuromuscular re-education, Physical agent/modality, Manual therapy, Patient education and Self Care training  Patient / Family readiness to learn indicated by: asking questions and interest  Persons(s) to be included in education: patient (P)  Barriers to Learning/Limitations: None  Patient Goal (s): understanding of chest and shoulder issues  Patient Self Reported Health Status: good  Rehabilitation Potential: good    Short Term Goals: To be accomplished in 2-4 treatments:   1. Pt will be compliant with initial HEP use and PT attendance  Long Term Goals: To be accomplished in 10-12 treatments:   1. Pt will be able to reach behind back without increase in symptoms   2. Pt will be able to perform push ups without increase in shoulder or chest    3. Pt will be able to self-manage care using updated HEP for imiproved independence   4. Improved FOTO score to 57 or better to demonstrate improved function  Frequency / Duration: Patient to be seen 1-2 times per week for 10-12 treatments. Patient/ Caregiver education and instruction: self care and exercises    [x]  Plan of care has been reviewed with MEIR White, PT, DPT 5/31/2022     ________________________________________________________________________    I certify that the above Therapy Services are being furnished while the patient is under my care. I agree with the treatment plan and certify that this therapy is necessary.     [facility] Signature:____________________  Date:____________Time: _________      Angelica Jung MD

## 2022-05-31 NOTE — PROGRESS NOTES
PT INITIAL EVALUATION NOTE 2-15    Patient Name: Page Andino. Date:2022  : 1990  [x]  Patient  Verified  Payor: Gerhardt Pitts / Plan: Lonnie Bassett / Product Type: Managed Care Medicaid /    In time: 020W  Out time:934a  Total Treatment Time (min): 52  Visit #: 1     Treatment Area: Rotator cuff tendonitis, left [M75.82]  Costochondritis [M94.0]    SUBJECTIVE  Pain Level (0-10 scale): 4  Any medication changes, allergies to medications, adverse drug reactions, diagnosis change, or new procedure performed?: [] No    [x] Yes (see summary sheet for update)  Subjective:     Patient reports with L shoulder pain that began after doing a dance move where he put all his weight through his L arm momentarily. He says that the pain is unchanged in the last 3 months. He also reports with chronic sternal region pain that is more on the L side predominately. Patient is R handed. 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 Has some pain with driving, both in the shoulder and chest.     Description of symptoms: posterior GH joint pain, L costo-sternal junction  Aggravating Factors: driving, reaching, dancing, weight bearing   Alleviating Factors: ice    Patient reports functional limitations with: dancing, reaching overhead    OBJECTIVE/EXAMINATION  Posture: Forward, rounded shoulders  Other Observations:  Repeated scaption: some reduced upward rotation bilaterally  Palpation: TTP over posterior/inferior capsular area, pec minor w/ increased turgor       (active/passive)  (active/passive)  Shoulder ROM:   R    L   Flexion   165deg   165deg   Abduction  150deg   145deg p! IR   L1    L1  p!    ER   T4    T4    Joint Mobility Assessment: Glenohumeral: slightly hypomobile with posterior glide       Sterno-costal : mildly hypomobile      Thoracic P-A mobs: mildly hypomobile in mid to lower thoracic spine    Flexibility: Posterior capsular tightness test: (+)    UPPER QUARTER   MUSCLE STRENGTH  KEY       R  L  0 - No Contraction   Flexion  --  --  1 - Trace    Extension --  --  2 - Poor    Abduction --  --  3 - Fair     IR  5  5  4 - Good    ER  5  5  5 - Normal    Mid-Trap --  --    Neurological: Reflexes / Sensations: nt  Special Tests: Michael: (+)     Neers: (+)      Load and Shift: (-)       5 min Therapeutic Exercise:  [x] See flow sheet :   Rationale: increase ROM, increase strength and improve coordination to improve the patients ability to move arm without pain          With   [] TE   [] TA   [] Neuro   [] SC   [] other: Patient Education: [x] Review HEP    [] Progressed/Changed HEP based on:   [] positioning   [] body mechanics   [] transfers   [] heat/ice application    [] other:      Other Objective/Functional Measures: FOTO Functional Measure: 57/100    Pain Level (0-10 scale) post treatment: 4      ASSESSMENT:      [x]  See Plan of SHANIKA Vásquez DPT 5/31/2022

## 2022-06-08 ENCOUNTER — HOSPITAL ENCOUNTER (OUTPATIENT)
Dept: PHYSICAL THERAPY | Age: 32
End: 2022-06-08
Payer: MEDICAID

## 2022-06-14 ENCOUNTER — HOSPITAL ENCOUNTER (OUTPATIENT)
Dept: PHYSICAL THERAPY | Age: 32
Discharge: HOME | End: 2022-06-14
Payer: MEDICAID

## 2022-06-14 PROCEDURE — 97110 THERAPEUTIC EXERCISES: CPT

## 2022-06-14 NOTE — PROGRESS NOTES
PT DAILY TREATMENT NOTE - [facility] 2-15    Patient Name: Lucas Garcia. Date:2022  : 1990  [x]  Patient  Verified  Payor: Kaleb Lynn / Plan: Baron Franks / Product Type: Managed Care Medicaid /    In time: 9:49A  Out time: 10:42A  Total Treatment Time (min): 43  Total Timed Codes (min): 33  1:1 Treatment Time ( [address] only): --   Visit #:  2    Treatment Area: Other shoulder lesions, left shoulder [M75.82]  Chondrocostal junction syndrome (tietze) [M94.0]    SUBJECTIVE  Pain Level (0-10 scale): 0/10  Any medication changes, allergies to medications, adverse drug reactions, diagnosis change, or new procedure performed?: [x] No    [] Yes (see summary sheet for update)  Subjective functional status/changes:   [] No changes reported  Pt reported doing okay this morning. Did not do HEP much last week due to being sick.  Did not get a chance to eat prior to PT session this morning,      OBJECTIVE    Modality rationale: decrease edema, decrease inflammation and decrease pain to improve the patients ability to decrease L shoulder pain   Min Type Additional Details       [] Estim: []Att   []Unatt    []TENS instruct                  []IFC  []Premod   []NMES                     []Other:  []w/US   []w/ice   []w/heat  Position:  Location:       []  Traction: [] Cervical       []Lumbar                       [] Prone          []Supine                       []Intermittent   []Continuous Lbs:  [] before manual  [] after manual  []w/heat    []  Ultrasound: []Continuous   [] Pulsed                       at: []1MHz   []3MHz Location:  W/cm2:    [] Paraffin         Location:   []w/heat   10 [x]  Ice     []  Heat  []  Ice massage Position:seated  Location: L shoulder    []  Laser  []  Other: Position:  Location:      []  Vasopneumatic Device Pressure:       [] lo [] med [] hi   Temperature:      [x] Skin assessment post-treatment:  [x]intact []redness- no adverse reaction    []redness - adverse reaction:     33 min Therapeutic Exercise:  [x] See flow sheet :   Rationale: increase ROM, increase strength, improve coordination and improve balance to improve the patients ability to increase function and mobility      With   [] TE   [] TA   [] Neuro   [] SC   [] other: Patient Education: [x] Review HEP    [] Progressed/Changed HEP based on:   [] positioning   [] body mechanics   [] transfers   [] heat/ice application    [] other:      Other Objective/Functional Measures: --     Pain Level (0-10 scale) post treatment: 0/10    ASSESSMENT/Changes in Function:   Pt tolerated scapular stabilization well today with no increase in shoulder pain. Plan to progress this next session and add to HEP. Pt may drop to once per week due to work. Advised pt to eat a little something prior to PT sessions. Patient will continue to benefit from skilled PT services to modify and progress therapeutic interventions, address functional mobility deficits, address ROM deficits, address strength deficits, analyze and address soft tissue restrictions, analyze and cue movement patterns, analyze and modify body mechanics/ergonomics and assess and modify postural abnormalities to attain remaining goals. []  See Plan of Care  []  See progress note/recertification  []  See Discharge Summary         Progress towards goals / Updated goals:  Short Term Goals: To be accomplished in 2-4 treatments:               1. Pt will be compliant with initial HEP use and PT attendance  Long Term Goals:  To be accomplished in 10-12 treatments:               1. Pt will be able to reach behind back without increase in symptoms               2. Pt will be able to perform push ups without increase in shoulder or chest                3. Pt will be able to self-manage care using updated HEP for imiproved independence               4. Improved FOTO score to 57 or better to demonstrate improved function  Frequency / Duration: Patient to be seen 1-2 times per week for 10-12 treatments.     PLAN  [x]  Upgrade activities as tolerated     [x]  Continue plan of care  []  Update interventions per flow sheet       []  Discharge due to:_  []  Other:_      Eleanor Acevedo, MEIR 6/14/2022

## 2022-06-17 ENCOUNTER — APPOINTMENT (OUTPATIENT)
Dept: PHYSICAL THERAPY | Age: 32
End: 2022-06-17
Payer: MEDICAID

## 2022-06-20 ENCOUNTER — HOSPITAL ENCOUNTER (OUTPATIENT)
Dept: PHYSICAL THERAPY | Age: 32
Discharge: HOME | End: 2022-06-20
Payer: MEDICAID

## 2022-06-20 PROCEDURE — 97110 THERAPEUTIC EXERCISES: CPT

## 2022-06-20 NOTE — PROGRESS NOTES
PT DAILY TREATMENT NOTE - [facility] 2-15    Patient Name: Jesus Manuel Perez. Date:2022  : 1990  [x]  Patient  Verified  Payor: Marlee Velazco / Plan: Thomas Veras / Product Type: Managed Care Medicaid /    In time: 9:25A Out time: 10:20A  Total Treatment Time (min): 55  Total Timed Codes (min): 45  1:1 Treatment Time ( [address] only): --   Visit #:  3    Treatment Area: Other shoulder lesions, left shoulder [M75.82]  Chondrocostal junction syndrome (tietze) [M94.0]    SUBJECTIVE  Pain Level (0-10 scale): 0/10  Any medication changes, allergies to medications, adverse drug reactions, diagnosis change, or new procedure performed?: [x] No    [] Yes (see summary sheet for update)  Subjective functional status/changes:   [] No changes reported  Pt reports shoulder feels about the same.      OBJECTIVE    Modality rationale: decrease edema, decrease inflammation and decrease pain to improve the patients ability to decrease L shoulder pain   Min Type Additional Details       [] Estim: []Att   []Unatt    []TENS instruct                  []IFC  []Premod   []NMES                     []Other:  []w/US   []w/ice   []w/heat  Position:  Location:       []  Traction: [] Cervical       []Lumbar                       [] Prone          []Supine                       []Intermittent   []Continuous Lbs:  [] before manual  [] after manual  []w/heat    []  Ultrasound: []Continuous   [] Pulsed                       at: []1MHz   []3MHz Location:  W/cm2:    [] Paraffin         Location:   []w/heat   10 [x]  Ice     []  Heat  []  Ice massage Position:seated  Location: L shoulder    []  Laser  []  Other: Position:  Location:      []  Vasopneumatic Device Pressure:       [] lo [] med [] hi   Temperature:      [x] Skin assessment post-treatment:  [x]intact []redness- no adverse reaction    []redness - adverse reaction:     45 min Therapeutic Exercise:  [x] See flow sheet : post cap stretch   Rationale: increase ROM, increase strength, improve coordination and improve balance to improve the patients ability to increase function and mobility      With   [] TE   [] TA   [] Neuro   [] SC   [] other: Patient Education: [x] Review HEP    [] Progressed/Changed HEP based on:   [] positioning   [] body mechanics   [] transfers   [] heat/ice application    [] other:      Other Objective/Functional Measures: --     Pain Level (0-10 scale) post treatment: 0/10    ASSESSMENT/Changes in Function:   Pt tolerated added RTC stabilization well today. Requires cuing on scapular stabilization and avoidance of lumbar lordosis. Patient will continue to benefit from skilled PT services to modify and progress therapeutic interventions, address functional mobility deficits, address ROM deficits, address strength deficits, analyze and address soft tissue restrictions, analyze and cue movement patterns, analyze and modify body mechanics/ergonomics and assess and modify postural abnormalities to attain remaining goals. []  See Plan of Care  []  See progress note/recertification  []  See Discharge Summary         Progress towards goals / Updated goals:  Short Term Goals: To be accomplished in 2-4 treatments:               1. Pt will be compliant with initial HEP use and PT attendance  Long Term Goals: To be accomplished in 10-12 treatments:               1. Pt will be able to reach behind back without increase in symptoms               2. Pt will be able to perform push ups without increase in shoulder or chest                3. Pt will be able to self-manage care using updated HEP for imiproved independence               4. Improved FOTO score to 57 or better to demonstrate improved function  Frequency / Duration: Patient to be seen 1-2 times per week for 10-12 treatments.     PLAN  [x]  Upgrade activities as tolerated     [x]  Continue plan of care  []  Update interventions per flow sheet       []  Discharge due to:_  []  Other:_      Chin Henao PTA 6/20/2022

## 2022-06-22 ENCOUNTER — HOSPITAL ENCOUNTER (OUTPATIENT)
Dept: PHYSICAL THERAPY | Age: 32
Discharge: HOME | End: 2022-06-22
Payer: MEDICAID

## 2022-06-22 PROCEDURE — 97110 THERAPEUTIC EXERCISES: CPT

## 2022-06-22 NOTE — PROGRESS NOTES
PT DAILY TREATMENT NOTE - [facility] 2-15    Patient Name: Zion Jesus. Date:2022  : 1990  [x]  Patient  Verified  Payor: Vianney Wood / Plan: [id number] / Product Type: Managed Care Medicaid /    In time: 9:26A Out time: 10:14A  Total Treatment Time (min): 58  Total Timed Codes (min): 48  1:1 Treatment Time ( [address] only): --   Visit #:  4    Treatment Area: Other shoulder lesions, left shoulder [M75.82]  Chondrocostal junction syndrome (tietze) [M94.0]    SUBJECTIVE  Pain Level (0-10 scale): 0/10  Any medication changes, allergies to medications, adverse drug reactions, diagnosis change, or new procedure performed?: [x] No    [] Yes (see summary sheet for update)  Subjective functional status/changes:   [] No changes reported  Pt reported yesterday pain along his sternum he was not doing anything when the pain came on. Did not do any stretches to work it out.      OBJECTIVE    Modality rationale: decrease edema, decrease inflammation and decrease pain to improve the patients ability to decrease L shoulder pain   Min Type Additional Details       [] Estim: []Att   []Unatt    []TENS instruct                  []IFC  []Premod   []NMES                     []Other:  []w/US   []w/ice   []w/heat  Position:  Location:       []  Traction: [] Cervical       []Lumbar                       [] Prone          []Supine                       []Intermittent   []Continuous Lbs:  [] before manual  [] after manual  []w/heat    []  Ultrasound: []Continuous   [] Pulsed                       at: []1MHz   []3MHz Location:  W/cm2:    [] Paraffin         Location:   []w/heat   10 [x]  Ice     []  Heat  []  Ice massage Position:seated  Location: L shoulder    []  Laser  []  Other: Position:  Location:      []  Vasopneumatic Device Pressure:       [] lo [] med [] hi   Temperature:      [x] Skin assessment post-treatment:  [x]intact []redness- no adverse reaction    []redness - adverse reaction:     48 min Therapeutic Exercise:  [x] See flow sheet :    Rationale: increase ROM, increase strength, improve coordination and improve balance to improve the patients ability to increase function and mobility      With   [] TE   [] TA   [] Neuro   [] SC   [] other: Patient Education: [x] Review HEP    [] Progressed/Changed HEP based on:   [] positioning   [] body mechanics   [] transfers   [] heat/ice application    [] other:      Other Objective/Functional Measures: --     Pain Level (0-10 scale) post treatment: 0/10    ASSESSMENT/Changes in Function:   Added to HEP for RTC and scapular stabilization. Pt demonstrated goo form and understanding of exercises. Patient will continue to benefit from skilled PT services to modify and progress therapeutic interventions, address functional mobility deficits, address ROM deficits, address strength deficits, analyze and address soft tissue restrictions, analyze and cue movement patterns, analyze and modify body mechanics/ergonomics and assess and modify postural abnormalities to attain remaining goals. []  See Plan of Care  []  See progress note/recertification  []  See Discharge Summary         Progress towards goals / Updated goals:  Short Term Goals: To be accomplished in 2-4 treatments:               1. Pt will be compliant with initial HEP use and PT attendance  Long Term Goals: To be accomplished in 10-12 treatments:               1. Pt will be able to reach behind back without increase in symptoms               2. Pt will be able to perform push ups without increase in shoulder or chest                3. Pt will be able to self-manage care using updated HEP for imiproved independence               4. Improved FOTO score to 57 or better to demonstrate improved function  Frequency / Duration: Patient to be seen 1-2 times per week for 10-12 treatments.     PLAN  [x]  Upgrade activities as tolerated     [x]  Continue plan of care  []  Update interventions per flow sheet       [] Discharge due to:_  []  Other:_      Cody Mota, PTA 6/22/2022

## 2022-06-27 ENCOUNTER — HOSPITAL ENCOUNTER (OUTPATIENT)
Dept: PHYSICAL THERAPY | Age: 32
Discharge: HOME | End: 2022-06-27
Payer: MEDICAID

## 2022-06-27 PROCEDURE — 97140 MANUAL THERAPY 1/> REGIONS: CPT | Performed by: PHYSICAL THERAPIST

## 2022-06-27 PROCEDURE — 97110 THERAPEUTIC EXERCISES: CPT | Performed by: PHYSICAL THERAPIST

## 2022-06-27 NOTE — PROGRESS NOTES
Physical Therapy at [facility],   a part of [facility] [facility]  [address] [address], [address] [place] [address], [facility] Lilia Barbara   Phone: [phone] Fax: [phone]    Progress Note    Name: Aamir Bajwa. : 1990   MD: Tora Hammans, MD       Treatment Diagnosis: Other shoulder lesions, left shoulder [M75.82]  Chondrocostal junction syndrome (tietze) [M94.0]  Start of Care: 22    Visits from Start of Care: 5  Missed Visits: 3    Summary of Care: Mr. Estephania Monreal is reporting >20% progress since beginning PT. He is doing better with postural awareness and progressing back into shoulder exercises. He is showing improvements with ability to perform push ups and with general mobility. Short Term Goals: To be accomplished in 2-4 treatments:               1. Pt will be compliant with initial HEP use and PT attendance PARTIALLY MET    Long Term Goals: To be accomplished in 10-12 treatments:               1. Pt will be able to reach behind back without increase in symptoms PROGRESSING               2. Pt will be able to perform push ups without increase in shoulder or chest MOSTLY MET               3. Pt will be able to self-manage care using updated HEP for imiproved independence PROGRESSING               4. Improved FOTO score to 57 or better to demonstrate improved function    Assessment / Recommendations:    Other: Progress per tolerance with emphasis on thoracic mobility and RTC and scapular exercises    Evangelina Martínez, PT, DPT 2022

## 2022-06-27 NOTE — PROGRESS NOTES
PT DAILY TREATMENT NOTE 2-15    Patient Name: Marquise Clancy. Date:2022  : 1990  [x]  Patient  Verified  Payor: Rony Cost / Plan: [email or web address] / Product Type: Managed Care Medicaid /    In time: 771O  Out time: 2642V  Total Treatment Time (min): 60  Visit #:  5    Treatment Area: Other shoulder lesions, left shoulder [M75.82]  Chondrocostal junction syndrome (tietze) [M94.0]    SUBJECTIVE  Pain Level (0-10 scale): 2  Any medication changes, allergies to medications, adverse drug reactions, diagnosis change, or new procedure performed?: [x] No    [] Yes (see summary sheet for update)  Subjective functional status/changes:   [] No changes reported  Overall, says things are about 20% improved since beginning PT.     OBJECTIVE            Modality rationale: decrease edema, decrease inflammation and decrease pain to improve the patients ability to decrease L shoulder pain    Min Type Additional Details        []? Estim: []? Att   []? Unatt    []? TENS instruct                  []?IFC  []? Premod   []? NMES                     []?Other:  []?w/US   []?w/ice   []?w/heat  Position:  Location:        []? Traction: []? Cervical       []? Lumbar                       []? Prone          []? Supine                       []?Intermittent   []? Continuous Lbs:  []? before manual  []? after manual  []?w/heat     []? Ultrasound: []? Continuous   []? Pulsed                       at: []?1MHz   []? 3MHz Location:  W/cm2:     []? Paraffin         Location:   []?w/heat   10 [x]? Ice     []? Heat  []? Ice massage Position:seated  Location: L shoulder     []? Laser  []? Other: Position:  Location:        []? Vasopneumatic Device Pressure:       []? lo []? med []? hi   Temperature:       [x]? Skin assessment post-treatment:  [x]? intact []? redness- no adverse reaction    []? redness - adverse reaction:      42 min Therapeutic Exercise:  [x]?  See flow sheet :    Rationale: increase ROM, increase strength, improve coordination and improve balance to improve the patients ability to increase function and mobility     8 min Manual Therapy: Post. GH mobilizations w/ concurrent IR/ER, posterior capsular stretching    Rationale: decrease pain, increase ROM and increase tissue extensibility to improve the patients ability to move without pain     With   []? TE   []? TA   []? Neuro   []? SC   []? other: Patient Education: [x]? Review HEP    []? Progressed/Changed HEP based on:   []? positioning   []? body mechanics   []? transfers   []? heat/ice application    []? other:       Other Objective/Functional Measures: --        Pain Level (0-10 scale) post treatment: 0/10    ASSESSMENT/Changes in Function:   Able to perform push ups today without difficulty or pain when cued to keep scapula retracted during movement. Showing good progress in today's session. Patient will continue to benefit from skilled PT services to modify and progress therapeutic interventions, address functional mobility deficits, address ROM deficits, address strength deficits, analyze and address soft tissue restrictions and analyze and cue movement patterns to attain remaining goals. []  See Plan of Care  []  See progress note/recertification  []  See Discharge Summary         Progress towards goals / Updated goals:  Short Term Goals: To be accomplished in 2-4 treatments:               1. Pt will be compliant with initial HEP use and PT attendance PARTIALLY MET    Long Term Goals: To be accomplished in 10-12 treatments:               1.  Pt will be able to reach behind back without increase in symptoms PROGRESSING               2. Pt will be able to perform push ups without increase in shoulder or chest MOSTLY MET               3. Pt will be able to self-manage care using updated HEP for imiproved independence PROGRESSING               4. Improved FOTO score to 57 or better to demonstrate improved function    PLAN  [x]  Upgrade activities as tolerated [x]  Continue plan of care  []  Update interventions per flow sheet       []  Discharge due to:_  []  Other:_      David Herrera, PT, DPT 6/27/2022

## 2022-06-29 ENCOUNTER — HOSPITAL ENCOUNTER (OUTPATIENT)
Dept: PHYSICAL THERAPY | Age: 32
Discharge: HOME | End: 2022-06-29
Payer: MEDICAID

## 2022-06-29 ENCOUNTER — OFFICE VISIT (OUTPATIENT)
Dept: INTERNAL MEDICINE CLINIC | Age: 32
End: 2022-06-29
Payer: MEDICAID

## 2022-06-29 VITALS
DIASTOLIC BLOOD PRESSURE: 89 MMHG | OXYGEN SATURATION: 99 % | WEIGHT: 268 LBS | TEMPERATURE: 98 F | HEIGHT: 73 IN | BODY MASS INDEX: 35.52 KG/M2 | SYSTOLIC BLOOD PRESSURE: 128 MMHG | HEART RATE: 70 BPM | RESPIRATION RATE: 16 BRPM

## 2022-06-29 DIAGNOSIS — M94.0 COSTOCHONDRITIS: ICD-10-CM

## 2022-06-29 DIAGNOSIS — M19.071 OSTEOARTHRITIS OF JOINT OF TOE OF RIGHT FOOT: ICD-10-CM

## 2022-06-29 DIAGNOSIS — M75.82 ROTATOR CUFF TENDONITIS, LEFT: Primary | ICD-10-CM

## 2022-06-29 PROCEDURE — 97140 MANUAL THERAPY 1/> REGIONS: CPT | Performed by: PHYSICAL THERAPIST

## 2022-06-29 PROCEDURE — 97110 THERAPEUTIC EXERCISES: CPT | Performed by: PHYSICAL THERAPIST

## 2022-06-29 PROCEDURE — 73660 X-RAY EXAM OF TOE(S): CPT | Performed by: FAMILY MEDICINE

## 2022-06-29 PROCEDURE — 99214 OFFICE O/P EST MOD 30 MIN: CPT | Performed by: FAMILY MEDICINE

## 2022-06-29 NOTE — PROGRESS NOTES
PT DAILY TREATMENT NOTE 2-15    Patient Name: Rashid Stone. Date:2022  : 1990  [x]  Patient  Verified  Payor: [employer] / Plan: Lizbeth Riojas / Product Type: Managed Care Medicaid /    In time: 5640Q  Out time: 1202p  Total Treatment Time (min): 62  Visit #:  6    Treatment Area: Other shoulder lesions, left shoulder [M75.82]  Chondrocostal junction syndrome (tietze) [M94.0]    SUBJECTIVE  Pain Level (0-10 scale): 1  Any medication changes, allergies to medications, adverse drug reactions, diagnosis change, or new procedure performed?: [x] No    [] Yes (see summary sheet for update)  Subjective functional status/changes:   [] No changes reported  Doing good today. Just saw Dr. Gerald Mehta and he wanted him to keep going with PT.     OBJECTIVE  Modality rationale: decrease edema, decrease inflammation and decrease pain to improve the patients ability to decrease L shoulder pain     Min Type Additional Details        []? ? Estim: []??Att   []? ? Unatt    []? ?TENS instruct                  []? ?IFC  []? ?Premod   []? ?NMES                     []? ? Other:  []??w/US   []? ?w/ice   []? ?w/heat  Position:  Location:        []? ?  Traction: []?? Cervical       []? ?Lumbar                       []? ? Prone          []? ?Supine                       []? ?Intermittent   []? ? Continuous Lbs:  []?? before manual  []? ? after manual  []? ?w/heat     []? ?  Ultrasound: []??Continuous   []? ? Pulsed                       WP: []??1MHz   []? ? 3MHz Location:  W/cm2:     []? ? Paraffin         Location:   []??w/heat   10 [x]? ?  Ice     []? ?  Heat  []? ?  Ice massage Position:seated  Location: B shoulders     []? ?  Laser  []? ?  Other: Position:  Location:        []? ?  Vasopneumatic Device Pressure:       []? ? lo []? ? med []?? hi   Temperature:       [x]? ? Skin assessment post-treatment:  [x]? ? intact []? ?redness- no adverse reaction    []? ?redness - adverse reaction:      44 min Therapeutic Exercise:  [x]? ? See flow sheet : Rationale: increase ROM, increase strength, improve coordination and improve balance to improve the patients ability to increase function and mobility     8 min Manual Therapy: Post. GH mobilizations w/ concurrent IR/ER, posterior capsular stretching    Rationale: decrease pain, increase ROM and increase tissue extensibility to improve the patients ability to move without pain     With   []?? TE   []?? TA   []? ? Neuro   []?? SC   []?? other: Patient Education: [x]? ? Review HEP    []? ? Progressed/Changed HEP based on:   []?? positioning   []? ? body mechanics   []? ? transfers   []? ? heat/ice application    []? ? other:       Other Objective/Functional Measures: --        Pain Level (0-10 scale) post treatment: 0/10     ASSESSMENT/Changes in Function:   Continuing to do well overall with PT and progressed scapular strengthening today. Patient will continue to benefit from skilled PT services to modify and progress therapeutic interventions, address functional mobility deficits, address ROM deficits, address strength deficits, analyze and address soft tissue restrictions and analyze and cue movement patterns to attain remaining goals. []? See Plan of Care  []? See progress note/recertification  []? See Discharge Summary         Progress towards goals / Updated goals:  Short Term Goals: To be accomplished in 2-4 treatments:               1. Pt will be compliant with initial HEP use and PT attendance PARTIALLY MET     Long Term Goals: To be accomplished in 10-12 treatments:               1. Pt will be able to reach behind back without increase in symptoms PROGRESSING               2. Pt will be able to perform push ups without increase in shoulder or chest MOSTLY MET               3. Pt will be able to self-manage care using updated HEP for imiproved independence PROGRESSING               4. Improved FOTO score to 57 or better to demonstrate improved function     PLAN  [x]?   Upgrade activities as tolerated [x]?  Continue plan of care  []? Update interventions per flow sheet       []? Discharge due to:_  []?   Other:_        Janey Barton, PT, DPT 6/29/2022

## 2022-06-29 NOTE — PROGRESS NOTES
Chief Complaint   Patient presents with    Back Pain    Shoulder Pain     he is a 27y.o. year old male who presents for follow up of injury. Follow Up Pain Assessment Encounter      Onset of Symptoms: Patient states he has had pain for months   ________________________________________________________________________  Description: Pain is now  has slightly improved      Pain Scale:(1-10): 4  Duration:  intermittent  Radiation: mid back, upper back and chest   What makes it better?: heat, ice and OTC meds  What makes it worse?:exercise  Medications tried: ibuprofen  Modalities tried: PT        Reviewed and agree with Nurse Note and duplicated in this note. Reviewed PmHx, RxHx, FmHx, SocHx, AllgHx and updated and dated in the chart. No family history on file. Past Medical History:   Diagnosis Date    GERD (gastroesophageal reflux disease)       Social History     Socioeconomic History    Marital status: SINGLE   Tobacco Use    Smoking status: Never Smoker    Smokeless tobacco: Never Used   Substance and Sexual Activity    Alcohol use: Yes    Drug use: Yes     Types: Marijuana    Sexual activity: Yes        Review of Systems - negative except as listed above      Objective:     Vitals:    06/29/22 0954   BP: 128/89   Pulse: 70   Resp: 16   Temp: 98 °F (36.7 °C)   SpO2: 99%   Weight: 268 lb (121.6 kg)   Height: 6' 1\" (1.854 m)       Physical Examination: General appearance - alert, well appearing, and in no distress  Chest - clear to auscultation, no wheezes, rales or rhonchi, symmetric air entry  Heart - normal rate, regular rhythm, normal S1, S2, no murmurs, rubs, clicks or gallops  Abdomen - soft, nontender, nondistended, no masses or organomegaly  Back exam - full range of motion, no tenderness, palpable spasm or pain on motion  Neurological - alert, oriented, normal speech, no focal findings or movement disorder noted  Musculoskeletal - The left shoulder is  normal to inspection.     The patient has diminished range with pain  . The shoulderis not tender to palpation . Bicep tendon: non-tender  The NEER test is negative. The Copeland test:is positive   The Cross over test:is  negative. The Empty Can test:is  negative. Stressed ext rotation is:  negative. Stressed int rotation: negative. The Apprehension Sign is negative. The Lift off test is:  negative   Examination reveals the Painful Arch:  positive. The Labral Test is:  negative. The Sulcus Sign is:  negative. Extremities - peripheral pulses normal, no pedal edema, no clubbing or cyanosis  Skin - normal coloration and turgor, no rashes, no suspicious skin lesions noted    Ultrasound of right great toe- hypoechoic fluid noted in MTP of the first digit with calcific debris  Assessment/ Plan:   Diagnoses and all orders for this visit:    1. Rotator cuff tendonitis, left    2. 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 Costochondritis    Patient is improving with physical therapy, will continue physical therapy for upper back and rotator cuff tendinitis  Consider cortisone injection for great toe and recommend firm soled shoe with good arch support      Pathophysiology, recovery and rehabilitation process discussed and questions answered   Counseling for 30 Minutes of the total visit duration   Pictures and figures used as necessary   Provided reassurance   Monitor response to Physical Therapy   Recommend activity modification   Recommend  lower impact activities-walking, Eliptical, Nordic Track, cycling or swimming               I have discussed the diagnosis with the patient and the intended plan as seen in the above orders. The patient has received an after-visit summary and questions were answered concerning future plans.      Medication Side Effects and Warnings were discussed with patient,  Patient Labs were reviewed and or requested, and  Patient Past Records were reviewed and or requested  yes     Pt agrees to call or return to clinic and/or go to closest ER with any worsening of symptoms. This may include, but not limited to increased fever (>100.4) with NSAIDS or Tylenol, increased edema, confusion, rash, worsening of presenting symptoms. Please note that this dictation was completed with [employer], the computer voice recognition software. Quite often unanticipated grammatical, syntax, homophones, and other interpretive errors are inadvertently transcribed by the computer software. Please disregard these errors. Please excuse any errors that have escaped final proofreading. Thank you.

## 2022-07-05 ENCOUNTER — HOSPITAL ENCOUNTER (OUTPATIENT)
Dept: PHYSICAL THERAPY | Age: 32
Discharge: HOME | End: 2022-07-05
Payer: MEDICAID

## 2022-07-05 PROCEDURE — 97140 MANUAL THERAPY 1/> REGIONS: CPT | Performed by: PHYSICAL THERAPIST

## 2022-07-05 PROCEDURE — 97110 THERAPEUTIC EXERCISES: CPT | Performed by: PHYSICAL THERAPIST

## 2022-07-05 NOTE — PROGRESS NOTES
PT DAILY TREATMENT NOTE 2-15     Patient Name: Park Fierro. Date:2022  : 1990   [x]  Patient  Verified  Payor: Diana Chawla / Plan: Eran Hidalgo / Product Type: Managed Care Medicaid /    In time:   Out time: 0a  Total Treatment Time (min): 62  Visit #:  7    Treatment Area: Other shoulder lesions, left shoulder [M75.82]  Chondrocostal junction syndrome (tietze) [M94.0]    SUBJECTIVE  Pain Level (0-10 scale): 0  Any medication changes, allergies to medications, adverse drug reactions, diagnosis change, or new procedure performed?: [x] No    [] Yes (see summary sheet for update)  Subjective functional status/changes:   [] No changes reported  Patient reports he is making progress       OBJECTIVE  Modality rationale: decrease edema, decrease inflammation and decrease pain to improve the patients ability to decrease L shoulder pain     Min Type Additional Details        []??? Estim: []? ? ? Att   []? ??Unatt    []? ??TENS instruct                  []? ??IFC  []? ? ?Premod   []? ??NMES                     []? ??Other:  []???w/US   []? ??w/ice   []? ??w/heat  Position:  Location:        []? ??  Traction: []??? Cervical       []? ? ?Lumbar                       []? ?? Prone          []? ? ?Supine                       []? ? ? Intermittent   []? ?? Continuous Lbs:  []??? before manual  []? ?? after manual  []? ??w/heat     []? ??  Ultrasound: []? ? ? Continuous   []? ?? Pulsed                       at: []???1MHz   []? ??3MHz Location:  W/cm2:     []??? Paraffin         Location:   []???w/heat   10 [x]? ??  Ice     []? ??  Heat  []? ??  Ice massage Position:seated  Location: B shoulders     []? ??  Laser  []? ??  Other: Position:  Location:        []? ??  Vasopneumatic Device Pressure:       []? ?? lo []? ?? med []? ?? hi   Temperature:       [x]? ?? Skin assessment post-treatment:  [x]? ??intact []? ??redness- no adverse reaction    []? ??redness - adverse reaction:      44 min Therapeutic Exercise:  [x]? ?? See flow sheet : Rationale: increase ROM, increase strength, improve coordination and improve balance to improve the patients ability to increase function and mobility     8 min Manual Therapy: Post. GH mobilizations w/ concurrent IR/ER, posterior capsular stretching    Rationale: decrease pain, increase ROM and increase tissue extensibility to improve the patients ability to move without pain     With   []??? TE   []??? TA   []??? Neuro   []??? SC   []? ?? other: Patient Education: [x]??? Review HEP    []? ?? Progressed/Changed HEP based on:   []??? positioning   []? ?? body mechanics   []? ?? transfers   []? ?? heat/ice application    []? ?? other:       Other Objective/Functional Measures: --        Pain Level (0-10 scale) post treatment: 0/10     ASSESSMENT/Changes in Function:   Continues to progress at each session. Will work into 1x/weekly to promote better self-management. Patient will continue to benefit from skilled PT services to modify and progress therapeutic interventions, address functional mobility deficits, address ROM deficits, address strength deficits, analyze and address soft tissue restrictions and analyze and cue movement patterns to attain remaining goals.     []? ?  See Plan of Care  []? ?  See progress note/recertification  []? ?  See Discharge Summary         Progress towards goals / Updated goals:  Short Term Goals: To be accomplished in 2-4 treatments:               1. Pt will be compliant with initial HEP use and PT attendance PARTIALLY MET     Long Term Goals: To be accomplished in 10-12 treatments:               1.  Pt will be able to reach behind back without increase in symptoms PROGRESSING               2. Pt will be able to perform push ups without increase in shoulder or chest MOSTLY MET               3. Pt will be able to self-manage care using updated HEP for imiproved independence PROGRESSING               4. Improved FOTO score to 57 or better to demonstrate improved function     PLAN  [x]? ?  Upgrade activities as tolerated     [x]? ?  Continue plan of care  []? ?  Update interventions per flow sheet       []? ?  Discharge due to:_  []??  Other:_          Leanna Keller, PT, DPT 7/5/2022

## 2022-07-07 ENCOUNTER — HOSPITAL ENCOUNTER (OUTPATIENT)
Dept: PHYSICAL THERAPY | Age: 32
Discharge: HOME | End: 2022-07-07
Payer: MEDICAID

## 2022-07-07 PROCEDURE — 97140 MANUAL THERAPY 1/> REGIONS: CPT | Performed by: PHYSICAL THERAPIST

## 2022-07-07 PROCEDURE — 97110 THERAPEUTIC EXERCISES: CPT | Performed by: PHYSICAL THERAPIST

## 2022-07-07 NOTE — PROGRESS NOTES
PT DAILY TREATMENT NOTE 2-15    Patient Name: Mya Delgado. [id number]  : 1990  [x]  Patient  Verified  Payor: Gregg Craven / Plan: Francoise Haver / Product Type: Managed Care Medicaid /    In time: 894Q  Out time: 1030a  Total Treatment Time (min): 58  Visit #:  8    Treatment Area: Other shoulder lesions, left shoulder [M75.82]  Chondrocostal junction syndrome (tietze) [M94.0]    SUBJECTIVE  Pain Level (0-10 scale): 0  Any medication changes, allergies to medications, adverse drug reactions, diagnosis change, or new procedure performed?: [x] No    [] Yes (see summary sheet for update)  Subjective functional status/changes:   [] No changes reported  Doing well, still occasionally experiencing the posterior shoulder pain. OBJECTIVE         Modality rationale: decrease edema, decrease inflammation and decrease pain to improve the patients ability to decrease L shoulder pain     Min Type Additional Details        []???? Estim: []?? ?? Att   []????Unatt    []????TENS instruct                  []????IFC  []????Premod   []????NMES                     []?? ??Other:  []????w/US   []? ???w/ice   []? ???w/heat  Position:  Location:        []????  Traction: []???? Cervical       []????Lumbar                       []???? Prone          []????Supine                       []?? ?? Intermittent   []???? Continuous Lbs:  []???? before manual  []???? after manual  []? ???w/heat     []????  Ultrasound: []???? Continuous   []???? Pulsed                       at: []????1MHz   []????3MHz Location:  W/cm2:     []???? Paraffin         Location:   []????w/heat   To go [x]? ???  Ice     []????  Heat  []????  Ice massage Position:seated  Location: B shoulders     []????  Laser  []????  Other: Position:  Location:        []????  Vasopneumatic Device Pressure:       []???? lo []???? med []???? hi   Temperature:       [x]? ??? Skin assessment post-treatment:  [x]? ???intact []? ???redness- no adverse reaction    []? ???redness Ruben Logan reaction:      48 min Therapeutic Exercise:  [x]? ??? See flow sheet :    Rationale: increase ROM, increase strength, improve coordination and improve balance to improve the patients ability to increase function and mobility     10 min Manual Therapy: Post. GH mobilizations w/ concurrent IR/ER, posterior capsular stretching    Rationale: decrease pain, increase ROM and increase tissue extensibility to improve the patients ability to move without pain     With   []???? TE   []???? TA   []???? Neuro   []???? SC   []???? other: Patient Education: [x]???? Review HEP    []???? Progressed/Changed HEP based on:   []???? positioning   []???? body mechanics   []???? transfers   []???? heat/ice application    []???? other:       Other Objective/Functional Measures: --        Pain Level (0-10 scale) post treatment: 0/10     ASSESSMENT/Changes in Function:   Doing well and is fatigued with scapular strengthening. Added in TB ER @60deg, which was challenging and mildly painful, but otherwise okay. Patient will continue to benefit from skilled PT services to modify and progress therapeutic interventions, address functional mobility deficits, address ROM deficits, address strength deficits, analyze and address soft tissue restrictions and analyze and cue movement patterns to attain remaining goals.     []? ??  See Plan of Care  []? ??  See progress note/recertification  []? ??  See Discharge Summary         Progress towards goals / Updated goals:  Short Term Goals: To be accomplished in 2-4 treatments:               1. Pt will be compliant with initial HEP use and PT attendance PARTIALLY MET     Long Term Goals: To be accomplished in 10-12 treatments:               1.  Pt will be able to reach behind back without increase in symptoms PROGRESSING               2. Pt will be able to perform push ups without increase in shoulder or chest MOSTLY MET               3. Pt will be able to self-manage care using updated HEP for imiproved independence PROGRESSING               4. Improved FOTO score to 57 or better to demonstrate improved function     PLAN  [x]???  Upgrade activities as tolerated     [x]? ??  Continue plan of care  []? ??  Update interventions per flow sheet       []???  Discharge due to:_  []???  Other:_        Annette Garcia, PT, DPT 7/7/2022

## 2022-07-14 ENCOUNTER — HOSPITAL ENCOUNTER (OUTPATIENT)
Dept: PHYSICAL THERAPY | Age: 32
Discharge: HOME | End: 2022-07-14
Payer: MEDICAID

## 2022-07-14 PROCEDURE — 97110 THERAPEUTIC EXERCISES: CPT | Performed by: PHYSICAL THERAPIST

## 2022-07-14 PROCEDURE — 97140 MANUAL THERAPY 1/> REGIONS: CPT | Performed by: PHYSICAL THERAPIST

## 2022-07-14 NOTE — PROGRESS NOTES
PT DAILY TREATMENT NOTE 2-15    Patient Name: Melissa Ritchie. Date:2022  : 1990  [x]  Patient  Verified  Payor: Erica Franklin / Plan: [id number] / Product Type: Managed Care Medicaid /    In time: 65a  Out time: 807a  Total Treatment Time (min): 60  Visit #:  9    Treatment Area: Other shoulder lesions, left shoulder [M75.82]  Chondrocostal junction syndrome (tietze) [M94.0]    SUBJECTIVE  Pain Level (0-10 scale): 0  Any medication changes, allergies to medications, adverse drug reactions, diagnosis change, or new procedure performed?: [x] No    [] Yes (see summary sheet for update)  Subjective functional status/changes:   [] No changes reported  Doing well today. Says that he hasn't really had any chest pain this last week. OBJECTIVE           Modality rationale: decrease edema, decrease inflammation and decrease pain to improve the patients ability to decrease L shoulder pain     Min Type Additional Details        []????? Estim: []??? ? ? Att   []??? ?? Unatt    []?????TENS instruct                  []?????IFC  []??? ? ? Premod   []??? ??NMES                     []??? ?? Other:  []?????w/US   []?????w/ice   []?????w/heat  Position:  Location:        []?????  Traction: []????? Cervical       []??? ? ?Lumbar                       []????? Prone          []??? ? ? Supine                       []??? ? ? Intermittent   []??? ? ? Continuous Lbs:  []????? before manual  []????? after manual  []?????w/heat     []?????  Ultrasound: []??? ? ? Continuous   []????? Pulsed                       at: []?????1MHz   []?????3MHz Location:  W/cm2:     []????? Paraffin         Location:   []?????w/heat   10 [x]?????  Ice     []?????  Heat  []?????  Ice massage Position:seated  Location: B shoulders     []?????  Laser  []?????  Other: Position:  Location:        []?????  Vasopneumatic Device Pressure:       []????? lo []????? med []????? hi   Temperature:       [x]????? Skin assessment post-treatment:  [x]? ????intact []?????redness- no adverse reaction    []?????redness - adverse reaction:      40 min Therapeutic Exercise:  [x]? ???? See flow sheet :    Rationale: increase ROM, increase strength, improve coordination and improve balance to improve the patients ability to increase function and mobility     10 min Manual Therapy: Post. GH mobilizations w/ concurrent IR/ER, posterior capsular stretching    Rationale: decrease pain, increase ROM and increase tissue extensibility to improve the patients ability to move without pain     With   []????? TE   []????? TA   []????? Neuro   []????? SC   []????? other: Patient Education: [x]????? Review HEP    []????? Progressed/Changed HEP based on:   []????? positioning   []????? body mechanics   []????? transfers   []????? heat/ice application    []????? other:       Other Objective/Functional Measures: --        Pain Level (0-10 scale) post treatment: 0/10     ASSESSMENT/Changes in Function:   Doing well today and shoulder is making progress at each session. Plan for 2 more sessions prior to d/c. Patient will continue to benefit from skilled PT services to modify and progress therapeutic interventions, address functional mobility deficits, address ROM deficits, address strength deficits, analyze and address soft tissue restrictions and analyze and cue movement patterns to attain remaining goals.     []????  See Plan of Care  []????  See progress note/recertification  []????  See Discharge Summary         Progress towards goals / Updated goals:  Short Term Goals: To be accomplished in 2-4 treatments:               1. Pt will be compliant with initial HEP use and PT attendance PARTIALLY MET     Long Term Goals: To be accomplished in 10-12 treatments:               1.  Pt will be able to reach behind back without increase in symptoms PROGRESSING               2. Pt will be able to perform push ups without increase in shoulder or chest MOSTLY MET               3. Pt will be able to self-manage care using updated HEP for imiproved independence PROGRESSING               4. Improved FOTO score to 57 or better to demonstrate improved function     PLAN  [x]????  Upgrade activities as tolerated     [x]? ???  Continue plan of care  []????  Update interventions per flow sheet       []????  Discharge due to:_  []????  Other:_          Zo Cuevas, PT, DPT 7/14/2022

## 2022-07-20 ENCOUNTER — HOSPITAL ENCOUNTER (OUTPATIENT)
Dept: PHYSICAL THERAPY | Age: 32
Discharge: HOME | End: 2022-07-20
Payer: MEDICAID

## 2022-07-20 PROCEDURE — 97140 MANUAL THERAPY 1/> REGIONS: CPT

## 2022-07-20 PROCEDURE — 97110 THERAPEUTIC EXERCISES: CPT

## 2022-07-20 NOTE — PROGRESS NOTES
PT DAILY TREATMENT NOTE 2-15    Patient Name: Hilda Burnett. Date:2022  : 1990  [x]  Patient  Verified  Payor: Adis Jarrell / Plan: [employer] / Product Type: Managed Care Medicaid /    In time: 10:08A Out time: 11:02A  Total Treatment Time (min): 54  Visit #:  10    Treatment Area: Other shoulder lesions, left shoulder [M75.82]  Chondrocostal junction syndrome (tietze) [M94.0]    SUBJECTIVE  Pain Level (0-10 scale): 2/10  Any medication changes, allergies to medications, adverse drug reactions, diagnosis change, or new procedure performed?: [x] No    [] Yes (see summary sheet for update)  Subjective functional status/changes:   [] No changes reported  Pt reported a little more pain in anterior shoulder.      OBJECTIVE           Modality rationale: decrease edema, decrease inflammation and decrease pain to improve the patients ability to decrease L shoulder pain     Min Type Additional Details        [] Estim: []Att   []Unatt    []TENS instruct                  []IFC  []Premod   []NMES                     []Other:  []w/US   []w/ice   []w/heat  Position:  Location:        []  Traction: [] Cervical       []Lumbar                       [] Prone          []Supine                       []Intermittent   []Continuous Lbs:  [] before manual  [] after manual  []w/heat     []  Ultrasound: []Continuous   [] Pulsed                       at: []1MHz   []3MHz Location:  W/cm2:     [] Paraffin         Location:   []w/heat   10 [x]  Ice     []  Heat  []  Ice massage Position:seated  Location: B shoulders     []  Laser  []  Other: Position:  Location:        []  Vasopneumatic Device Pressure:       [] lo [] med [] hi   Temperature:       [x] Skin assessment post-treatment:  [x]intact []redness- no adverse reaction    []redness - adverse reaction:      34 min Therapeutic Exercise:  [x] See flow sheet :    Rationale: increase ROM, increase strength, improve coordination and improve balance to improve the patients ability to increase function and mobility     10 min Manual Therapy: Post. GH mobilizations w/ concurrent IR/ER, posterior capsular stretching    Rationale: decrease pain, increase ROM and increase tissue extensibility to improve the patients ability to move without pain     With   [] TE   [] TA   [] Neuro   [] SC   [] other: Patient Education: [x] Review HEP    [] Progressed/Changed HEP based on:   [] positioning   [] body mechanics   [] transfers   [] heat/ice application    [] other:       Other Objective/Functional Measures: --        Pain Level (0-10 scale) post treatment: 0/10     ASSESSMENT/Changes in Function:   Pt reported pain in shoulder with ER at 90. Resolution of pain with postural correction and manual. Pt will FU with PT next session for reassessment and plan moving forward. Patient will continue to benefit from skilled PT services to modify and progress therapeutic interventions, address functional mobility deficits, address ROM deficits, address strength deficits, analyze and address soft tissue restrictions and analyze and cue movement patterns to attain remaining goals. []  See Plan of Care  []  See progress note/recertification  []  See Discharge Summary         Progress towards goals / Updated goals:  Short Term Goals: To be accomplished in 2-4 treatments:               1. Pt will be compliant with initial HEP use and PT attendance PARTIALLY MET     Long Term Goals:  To be accomplished in 10-12 treatments:               1. Pt will be able to reach behind back without increase in symptoms PROGRESSING               2. Pt will be able to perform push ups without increase in shoulder or chest MOSTLY MET               3. Pt will be able to self-manage care using updated HEP for imiproved independence PROGRESSING               4. Improved FOTO score to 57 or better to demonstrate improved function     PLAN  [x]  Upgrade activities as tolerated     [x]  Continue plan of care  [] Update interventions per flow sheet       []  Discharge due to:_  []  Other:_          Trisha Chandra PTA 7/20/2022

## 2022-08-10 ENCOUNTER — HOSPITAL ENCOUNTER (OUTPATIENT)
Dept: PHYSICAL THERAPY | Age: 32
Discharge: HOME | End: 2022-08-10
Payer: MEDICAID

## 2022-08-10 PROCEDURE — 97110 THERAPEUTIC EXERCISES: CPT | Performed by: PHYSICAL THERAPIST

## 2022-08-10 PROCEDURE — 97140 MANUAL THERAPY 1/> REGIONS: CPT | Performed by: PHYSICAL THERAPIST

## 2022-08-10 NOTE — PROGRESS NOTES
PT DAILY TREATMENT NOTE 2-15    Patient Name: Estefania Vidales. Date:8/10/2022  : 1990  [x]  Patient  Verified  Payor: John Hicks / Plan: [id number] [place] / Product Type: Managed Care Medicaid /    In time: 3105N  Out time: 2920K  Total Treatment Time (min): 47  Visit #: 11    Treatment Area: Other shoulder lesions, left shoulder [M75.82]  Chondrocostal junction syndrome (tietze) [M94.0]    SUBJECTIVE  Pain Level (0-10 scale): 3  Any medication changes, allergies to medications, adverse drug reactions, diagnosis change, or new procedure performed?: [x] No    [] Yes (see summary sheet for update)  Subjective functional status/changes:   [] No changes reported  Some shoulder impingement feeling these last 2 days. OBJECTIVE     39 min Therapeutic Exercise:  [x] See flow sheet :   Rationale: increase ROM, increase strength, improve coordination and improve balance to improve the patients ability to increase function and mobility     8 min Manual Therapy: Post. GH mobilizations w/ concurrent IR/ER, posterior capsular stretching    Rationale: decrease pain, increase ROM and increase tissue extensibility to improve the patients ability to move without pain     With   [] TE   [] TA   [] Neuro   [] SC   [] other: Patient Education: [x] Review HEP    [] Progressed/Changed HEP based on:  [] positioning   [] body mechanics   [] transfers   [] heat/ice application    [] other:      Other Objective/Functional Measures: --        Pain Level (0-10 scale) post treatment: 0/10     ASSESSMENT/Changes in Function:   []  See Plan of Care  []  See progress note/recertification  [x]  See Discharge Summary         Progress towards goals / Updated goals:  Short Term Goals: To be accomplished in 2-4 treatments:               1. Pt will be compliant with initial HEP use and PT attendance MET     Long Term Goals:  To be accomplished in 10-12 treatments:               1. Pt will be able to reach behind back without increase in symptoms MET               2. Pt will be able to perform push ups without increase in shoulder or chest MET               3. Pt will be able to self-manage care using updated HEP for imiproved independence MOSTLY MET               4. Improved FOTO score to 57 or better to demonstrate improved function MOSTLY MET     PLAN  []  Upgrade activities as tolerated     []  Continue plan of care  []  Update interventions per flow sheet       [x]  Discharge due to: d/c  []  Other:_      Irma Subramanian, PT, DPT 8/10/2022

## 2022-08-10 NOTE — THERAPY DISCHARGE
Physical Therapy at [facility],   a part of [address] [place] [address], [address] [place] [address], [address] [place] [address]  Phone: [phone]  Fax: [phone]    Medicaid Discharge Summary  2-15    Patient name: Renay Fleischer. 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 He is managing his symptoms much better, and has much reduced symptoms with activity. He should be able to self-manage care moving forward at this time. Short Term Goals: To be accomplished in 2-4 treatments:               1. Pt will be compliant with initial HEP use and PT attendance MET     Long Term Goals:  To be accomplished in 10-12 treatments:               1. Pt will be able to reach behind back without increase in symptoms MET               2. Pt will be able to perform push ups without increase in shoulder or chest MET               3. Pt will be able to self-manage care using updated HEP for imiproved independence MOSTLY MET               4. Improved FOTO score to 57 or better to demonstrate improved function DID NOT ASSESS    RECOMMENDATIONS:  [x]Discontinue therapy: [x]Patient has reached or is progressing toward set goals      []Patient is non-compliant or has abdicated      []Due to lack of appreciable progress towards set goals    Kristine Hayward PT, DPT 8/10/2022     ______________________________________________________________________    NOTE TO PHYSICIAN:  Please complete the following and fax to:  Physical Therapy at [facility] a [facility] of [facility] [place]: Fax: [phone] . Howard Baires Retain this original for your records. If you are unable to process this request in 24 hours, please contact our office.      Physician's Signature:____________________  Date:____________Time:_________           Heike Rai MD

## 2023-06-07 ENCOUNTER — OFFICE VISIT (OUTPATIENT)
Facility: CLINIC | Age: 33
End: 2023-06-07
Payer: COMMERCIAL

## 2023-06-07 VITALS
HEART RATE: 58 BPM | OXYGEN SATURATION: 97 % | TEMPERATURE: 97.3 F | BODY MASS INDEX: 36.43 KG/M2 | HEIGHT: 73 IN | RESPIRATION RATE: 18 BRPM | SYSTOLIC BLOOD PRESSURE: 125 MMHG | WEIGHT: 274.9 LBS | DIASTOLIC BLOOD PRESSURE: 85 MMHG

## 2023-06-07 DIAGNOSIS — Z00.00 VISIT FOR WELL MAN HEALTH CHECK: Primary | ICD-10-CM

## 2023-06-07 DIAGNOSIS — Z11.59 NEED FOR HEPATITIS C SCREENING TEST: ICD-10-CM

## 2023-06-07 DIAGNOSIS — M70.42 PREPATELLAR BURSITIS OF LEFT KNEE: ICD-10-CM

## 2023-06-07 DIAGNOSIS — Z20.2 POSSIBLE EXPOSURE TO STD: ICD-10-CM

## 2023-06-07 PROCEDURE — 99214 OFFICE O/P EST MOD 30 MIN: CPT | Performed by: FAMILY MEDICINE

## 2023-06-07 PROCEDURE — 99395 PREV VISIT EST AGE 18-39: CPT | Performed by: FAMILY MEDICINE

## 2023-06-07 RX ORDER — NAPROXEN 500 MG/1
500 TABLET ORAL 2 TIMES DAILY WITH MEALS
Qty: 60 TABLET | Refills: 3 | Status: SHIPPED | OUTPATIENT
Start: 2023-06-07

## 2023-06-07 ASSESSMENT — PATIENT HEALTH QUESTIONNAIRE - PHQ9
2. FEELING DOWN, DEPRESSED OR HOPELESS: 1
SUM OF ALL RESPONSES TO PHQ QUESTIONS 1-9: 2
SUM OF ALL RESPONSES TO PHQ9 QUESTIONS 1 & 2: 2
SUM OF ALL RESPONSES TO PHQ QUESTIONS 1-9: 2
SUM OF ALL RESPONSES TO PHQ QUESTIONS 1-9: 2
1. LITTLE INTEREST OR PLEASURE IN DOING THINGS: 1
SUM OF ALL RESPONSES TO PHQ QUESTIONS 1-9: 2

## 2023-06-07 NOTE — PROGRESS NOTES
Sign:  negative  Patellar Grind negative   Pain with palpation of the prepatellar bursa with no inflammation or erythema noted  Extremities - peripheral pulses normal, no pedal edema, no clubbing or cyanosis  Skin - normal coloration and turgor, no rashes, no suspicious skin lesions noted     Assessment/ Plan:   1. Visit for well man health check  -     CBC with Auto Differential; Future  -     Comprehensive Metabolic Panel; Future  -     Lipid Panel; Future  2. Need for hepatitis C screening test  -     Hepatitis C Antibody; Future  3. Possible exposure to STD  -     Chlamydia, Gonorrhea, Trichomoniasis; Future  -     CT+NG+M Genitalium by AN, Ur; Future  -     RPR; Future  -     HIV 1/2 Ag/Ab, 4TH Generation,W Rflx Confirm; Future  4. Prepatellar bursitis of left knee  -     naproxen (NAPROSYN) 500 MG tablet; Take 1 tablet by mouth 2 times daily (with meals), Disp-60 tablet, R-3Normal     No follow-ups on file. I have discussed the diagnosis with the patient and the intended plan as seen in the above orders. The patient has received an after-visit summary and questions were answered concerning future plans. Medication Side Effects and Warnings were discussed with patient,  Patient Labs were reviewed and or requested, and  Patient Past Records were reviewed and or requested  yes       Pt agrees to call or return to clinic and/or go to closest ER with any worsening of symptoms. This may include, but not limited to increased fever (>100.4) with NSAIDS or Tylenol, increased edema, confusion, rash, worsening of presenting symptoms. Please note that this dictation was completed with [employer], the computer voice recognition software. Quite often unanticipated grammatical, syntax, homophones, and other interpretive errors are inadvertently transcribed by the computer software. Please disregard these errors. Please excuse any errors that have escaped final proofreading. Thank you.

## 2023-06-08 LAB
ALBUMIN SERPL-MCNC: 3.8 G/DL (ref 3.5–5)
ALBUMIN/GLOB SERPL: 1.1 (ref 1.1–2.2)
ALP SERPL-CCNC: 86 U/L (ref 45–117)
ALT SERPL-CCNC: 21 U/L (ref 12–78)
ANION GAP SERPL CALC-SCNC: 3 MMOL/L (ref 5–15)
AST SERPL-CCNC: 15 U/L (ref 15–37)
BASOPHILS # BLD: 0 K/UL (ref 0–0.1)
BASOPHILS NFR BLD: 0 % (ref 0–1)
BILIRUB SERPL-MCNC: 0.3 MG/DL (ref 0.2–1)
BUN SERPL-MCNC: 13 MG/DL (ref 6–20)
BUN/CREAT SERPL: 11 (ref 12–20)
CALCIUM SERPL-MCNC: 9.3 MG/DL (ref 8.5–10.1)
CHLORIDE SERPL-SCNC: 110 MMOL/L (ref 97–108)
CHOLEST SERPL-MCNC: 148 MG/DL
CO2 SERPL-SCNC: 27 MMOL/L (ref 21–32)
CREAT SERPL-MCNC: 1.2 MG/DL (ref 0.7–1.3)
DIFFERENTIAL METHOD BLD: ABNORMAL
EOSINOPHIL # BLD: 0.1 K/UL (ref 0–0.4)
EOSINOPHIL NFR BLD: 1 % (ref 0–7)
ERYTHROCYTE [DISTWIDTH] IN BLOOD BY AUTOMATED COUNT: 12.5 % (ref 11.5–14.5)
GLOBULIN SER CALC-MCNC: 3.6 G/DL (ref 2–4)
GLUCOSE SERPL-MCNC: 85 MG/DL (ref 65–100)
HCT VFR BLD AUTO: 42 % (ref 36.6–50.3)
HCV AB SERPL QL IA: NONREACTIVE
HDLC SERPL-MCNC: 38 MG/DL
HDLC SERPL: 3.9 (ref 0–5)
HGB BLD-MCNC: 13.6 G/DL (ref 12.1–17)
HIV 1+2 AB+HIV1 P24 AG SERPL QL IA: NONREACTIVE
HIV 1/2 RESULT COMMENT: NORMAL
IMM GRANULOCYTES # BLD AUTO: 0 K/UL (ref 0–0.04)
IMM GRANULOCYTES NFR BLD AUTO: 1 % (ref 0–0.5)
LDLC SERPL CALC-MCNC: 96.8 MG/DL (ref 0–100)
LYMPHOCYTES # BLD: 1.7 K/UL (ref 0.8–3.5)
LYMPHOCYTES NFR BLD: 19 % (ref 12–49)
MCH RBC QN AUTO: 30.7 PG (ref 26–34)
MCHC RBC AUTO-ENTMCNC: 32.4 G/DL (ref 30–36.5)
MCV RBC AUTO: 94.8 FL (ref 80–99)
MONOCYTES # BLD: 0.7 K/UL (ref 0–1)
MONOCYTES NFR BLD: 8 % (ref 5–13)
NEUTS SEG # BLD: 6.2 K/UL (ref 1.8–8)
NEUTS SEG NFR BLD: 71 % (ref 32–75)
NRBC # BLD: 0 K/UL (ref 0–0.01)
NRBC BLD-RTO: 0 PER 100 WBC
PLATELET # BLD AUTO: 348 K/UL (ref 150–400)
PMV BLD AUTO: 9.8 FL (ref 8.9–12.9)
POTASSIUM SERPL-SCNC: 4.2 MMOL/L (ref 3.5–5.1)
PROT SERPL-MCNC: 7.4 G/DL (ref 6.4–8.2)
RBC # BLD AUTO: 4.43 M/UL (ref 4.1–5.7)
RPR SER QL: NONREACTIVE
SODIUM SERPL-SCNC: 140 MMOL/L (ref 136–145)
TRIGL SERPL-MCNC: 66 MG/DL
VLDLC SERPL CALC-MCNC: 13.2 MG/DL
WBC # BLD AUTO: 8.7 K/UL (ref 4.1–11.1)

## 2023-06-10 LAB
C TRACH RRNA UR QL NAA+PROBE: NEGATIVE
N GONORRHOEA RRNA UR QL NAA+PROBE: NEGATIVE
SPECIMEN SOURCE: NORMAL

## (undated) DEVICE — TUBING HYDR IRR --

## (undated) DEVICE — FORCEPS BX L240CM JAW DIA2.8MM L CAP W/ NDL MIC MESH TOOTH